# Patient Record
Sex: FEMALE | Race: WHITE | Employment: FULL TIME | URBAN - METROPOLITAN AREA
[De-identification: names, ages, dates, MRNs, and addresses within clinical notes are randomized per-mention and may not be internally consistent; named-entity substitution may affect disease eponyms.]

---

## 2017-02-03 RX ORDER — CHLORAL HYDRATE 500 MG
1000 CAPSULE ORAL
COMMUNITY
End: 2019-06-29 | Stop reason: ALTCHOICE

## 2017-02-03 RX ORDER — GARLIC EXTRACT 500 MG
1 CAPSULE ORAL DAILY
COMMUNITY
End: 2018-08-06

## 2017-02-03 RX ORDER — MULTIVIT WITH MINERALS/LUTEIN
250 TABLET ORAL DAILY
COMMUNITY
End: 2018-06-04

## 2017-02-03 RX ORDER — DOCUSATE SODIUM 100 MG/1
100 CAPSULE, LIQUID FILLED ORAL DAILY
COMMUNITY
End: 2019-01-16

## 2017-02-10 ENCOUNTER — APPOINTMENT (OUTPATIENT)
Dept: LAB | Age: 57
End: 2017-02-10
Payer: COMMERCIAL

## 2017-02-10 DIAGNOSIS — M18.12 OSTEOARTHRITIS OF CARPOMETACARPAL JOINT OF LEFT THUMB: ICD-10-CM

## 2017-02-10 LAB
ATRIAL RATE: 67 BPM
BUN BLD-MCNC: 15 MG/DL (ref 8–20)
CALCIUM BLD-MCNC: 9 MG/DL (ref 8.3–10.3)
CHLORIDE: 105 MMOL/L (ref 101–111)
CO2: 26 MMOL/L (ref 22–32)
CREAT BLD-MCNC: 0.61 MG/DL (ref 0.55–1.02)
GLUCOSE BLD-MCNC: 92 MG/DL (ref 70–99)
P AXIS: 53 DEGREES
P-R INTERVAL: 194 MS
POTASSIUM SERPL-SCNC: 4.1 MMOL/L (ref 3.6–5.1)
Q-T INTERVAL: 394 MS
QRS DURATION: 80 MS
QTC CALCULATION (BEZET): 416 MS
R AXIS: 6 DEGREES
SODIUM SERPL-SCNC: 140 MMOL/L (ref 136–144)
T AXIS: 25 DEGREES
VENTRICULAR RATE: 67 BPM

## 2017-02-10 PROCEDURE — 80048 BASIC METABOLIC PNL TOTAL CA: CPT

## 2017-02-10 PROCEDURE — 93005 ELECTROCARDIOGRAM TRACING: CPT

## 2017-02-10 PROCEDURE — 93010 ELECTROCARDIOGRAM REPORT: CPT | Performed by: INTERNAL MEDICINE

## 2017-02-10 PROCEDURE — 36415 COLL VENOUS BLD VENIPUNCTURE: CPT

## 2017-02-28 ENCOUNTER — HOSPITAL ENCOUNTER (OUTPATIENT)
Facility: HOSPITAL | Age: 57
Setting detail: HOSPITAL OUTPATIENT SURGERY
Discharge: HOME | End: 2017-02-28
Attending: ORTHOPAEDIC SURGERY | Admitting: ORTHOPAEDIC SURGERY
Payer: COMMERCIAL

## 2017-02-28 ENCOUNTER — ANESTHESIA (OUTPATIENT)
Dept: SURGERY | Facility: HOSPITAL | Age: 57
End: 2017-02-28
Payer: COMMERCIAL

## 2017-02-28 ENCOUNTER — SURGERY (OUTPATIENT)
Age: 57
End: 2017-02-28

## 2017-02-28 ENCOUNTER — ANESTHESIA EVENT (OUTPATIENT)
Dept: SURGERY | Facility: HOSPITAL | Age: 57
End: 2017-02-28
Payer: COMMERCIAL

## 2017-02-28 VITALS
DIASTOLIC BLOOD PRESSURE: 64 MMHG | RESPIRATION RATE: 18 BRPM | BODY MASS INDEX: 39.26 KG/M2 | WEIGHT: 250.13 LBS | SYSTOLIC BLOOD PRESSURE: 127 MMHG | HEIGHT: 67 IN | TEMPERATURE: 98 F | OXYGEN SATURATION: 94 % | HEART RATE: 79 BPM

## 2017-02-28 DIAGNOSIS — M18.12 OSTEOARTHRITIS OF CARPOMETACARPAL JOINT OF LEFT THUMB: Primary | ICD-10-CM

## 2017-02-28 PROCEDURE — 0RQT0ZZ REPAIR LEFT CARPOMETACARPAL JOINT, OPEN APPROACH: ICD-10-PCS | Performed by: ORTHOPAEDIC SURGERY

## 2017-02-28 PROCEDURE — 3E0T3CZ INTRODUCTION OF REGIONAL ANESTHETIC INTO PERIPHERAL NERVES AND PLEXI, PERCUTANEOUS APPROACH: ICD-10-PCS | Performed by: ANESTHESIOLOGY

## 2017-02-28 PROCEDURE — 76942 ECHO GUIDE FOR BIOPSY: CPT | Performed by: ORTHOPAEDIC SURGERY

## 2017-02-28 RX ORDER — MEPERIDINE HYDROCHLORIDE 25 MG/ML
12.5 INJECTION INTRAMUSCULAR; INTRAVENOUS; SUBCUTANEOUS AS NEEDED
Status: DISCONTINUED | OUTPATIENT
Start: 2017-02-28 | End: 2017-02-28

## 2017-02-28 RX ORDER — HYDROMORPHONE HYDROCHLORIDE 1 MG/ML
0.4 INJECTION, SOLUTION INTRAMUSCULAR; INTRAVENOUS; SUBCUTANEOUS EVERY 5 MIN PRN
Status: DISCONTINUED | OUTPATIENT
Start: 2017-02-28 | End: 2017-02-28

## 2017-02-28 RX ORDER — SCOLOPAMINE TRANSDERMAL SYSTEM 1 MG/1
1 PATCH, EXTENDED RELEASE TRANSDERMAL
Status: DISCONTINUED | OUTPATIENT
Start: 2017-02-28 | End: 2017-02-28 | Stop reason: HOSPADM

## 2017-02-28 RX ORDER — ONDANSETRON 2 MG/ML
4 INJECTION INTRAMUSCULAR; INTRAVENOUS AS NEEDED
Status: DISCONTINUED | OUTPATIENT
Start: 2017-02-28 | End: 2017-02-28

## 2017-02-28 RX ORDER — DEXAMETHASONE SODIUM PHOSPHATE 4 MG/ML
4 VIAL (ML) INJECTION AS NEEDED
Status: DISCONTINUED | OUTPATIENT
Start: 2017-02-28 | End: 2017-02-28

## 2017-02-28 RX ORDER — NALOXONE HYDROCHLORIDE 0.4 MG/ML
80 INJECTION, SOLUTION INTRAMUSCULAR; INTRAVENOUS; SUBCUTANEOUS AS NEEDED
Status: DISCONTINUED | OUTPATIENT
Start: 2017-02-28 | End: 2017-02-28

## 2017-02-28 RX ORDER — SCOLOPAMINE TRANSDERMAL SYSTEM 1 MG/1
PATCH, EXTENDED RELEASE TRANSDERMAL
Status: DISCONTINUED
Start: 2017-02-28 | End: 2017-02-28

## 2017-02-28 RX ORDER — SODIUM CHLORIDE, SODIUM LACTATE, POTASSIUM CHLORIDE, CALCIUM CHLORIDE 600; 310; 30; 20 MG/100ML; MG/100ML; MG/100ML; MG/100ML
INJECTION, SOLUTION INTRAVENOUS CONTINUOUS
Status: DISCONTINUED | OUTPATIENT
Start: 2017-02-28 | End: 2017-02-28

## 2017-02-28 RX ORDER — METOCLOPRAMIDE HYDROCHLORIDE 5 MG/ML
10 INJECTION INTRAMUSCULAR; INTRAVENOUS AS NEEDED
Status: DISCONTINUED | OUTPATIENT
Start: 2017-02-28 | End: 2017-02-28

## 2017-02-28 RX ORDER — MIDAZOLAM HYDROCHLORIDE 1 MG/ML
1 INJECTION INTRAMUSCULAR; INTRAVENOUS EVERY 5 MIN PRN
Status: DISCONTINUED | OUTPATIENT
Start: 2017-02-28 | End: 2017-02-28

## 2017-02-28 NOTE — ANESTHESIA PREPROCEDURE EVALUATION
PRE-OP EVALUATION    Patient Name: Sophie Vela    Pre-op Diagnosis: LEFT THUMB CARPOMETACARPAL OSTEOARTHRITIS    Procedure(s):  LEFT THUMB CARPOMETACARPAL JOINT SUSPENSION ARTHROPLASTY    Surgeon(s) and Role:     Elsa Louise MD - Primary    Pre- >4    (+) obesity  (+) hypertension and well controlled  (+) hyperlipidemia                                  Endo/Other    Negative endo/other ROS.                       (+) arthritis       Pulmonary                 (-) recent URI   (-) sleep apnea (states BRANCH NERVE BLOCK;  Surgeon: Apolonia Hobbs MD;  Location: [address] [place] [address]  [id number]    Comment microdisectomy-lumbar-fusion    BACK SURGERY  5/17/16    Comment Hardward removal L1-5; fusion L1-4        Smoking status: F

## 2017-02-28 NOTE — ANESTHESIA POSTPROCEDURE EVALUATION
[address] [facility] Patient Status:  Hospital Outpatient Surgery   Age/Gender 61year old female MRN [id number]   [facility] SURGERY Attending Wilbert Kim MD   [address] Day # 0 PCP Yumiko Ibarra MD       Anesthesia Post-

## 2017-02-28 NOTE — H&P
[address] Patient Status:  Hospital Outpatient Surgery    1960 MRN [id number]   [facility] SURGERY Attending Janak Cheng MD   [facility] Day #  PCP Vanita Hunter MD     History of Pres BRANCH NERVE BLOCK;  Surgeon: Apolonia Hobbs MD;  Location: [facility]IN [facility]    INJ PARAVERT F JNT L/S 1 LEV Bilateral 10/19/2015    Comment Procedure: LUMBAR FACET INJECTION OR MEDIAL BRANCH NERVE BLOCK;  Surgeon: Apolonia Hobbs MD;  L are equal and round, reactive to light and accommodate. Pupils are approximately 3mm and react to 2mm with reaction to light. Oropharynx is clear. Neck: No tenderness to palpitation.   Full range of motion to flexion and extension, lateral rotation and l

## 2017-02-28 NOTE — BRIEF OP NOTE
[facility] SURGERY  Brief Op Note     Leif Kenzie Location: OR   CSN [id number] MRN [id number]   Admission Date 2/28/2017 Operation Date 2/28/2017   Attending Physician Slim Matias MD Operating Physician Cornelio Rendon MD       Pre-Operative Diag

## 2017-03-01 NOTE — OPERATIVE REPORT
[facility]    PATIENT'S NAME: Corrinne Corti   ATTENDING PHYSICIAN: Florian Carmichael M.D. OPERATING PHYSICIAN: Florian Carmichael M.D.    PATIENT ACCOUNT#:   [de-identified]    LOCATION:  [address] [place] [address] 10  MEDICAL RECORD #:   [id number]       DATE A tourniquet was placed about the left biceps, and the left upper extremity was prepped and draped in the standard surgical fashion. A surgical time-out was taken, in which the proper patient, surgical site, and procedure were verified.   The limb was exsa Ethibond suture. The graft was then looped around the remnant of the flexor carpi radialis tendon, and this was then woven around each tendon 3 separate times for the complete tendon weave.   This was tensioned, and then was sutured in place with the 3-0 E

## 2017-03-10 PROBLEM — M18.12 OSTEOARTHRITIS OF CARPOMETACARPAL JOINT OF LEFT THUMB: Status: ACTIVE | Noted: 2017-03-10

## 2017-04-03 ENCOUNTER — OFFICE VISIT (OUTPATIENT)
Dept: OCCUPATIONAL MEDICINE | Age: 57
End: 2017-04-03
Attending: ORTHOPAEDIC SURGERY
Payer: COMMERCIAL

## 2017-04-03 PROCEDURE — 97166 OT EVAL MOD COMPLEX 45 MIN: CPT

## 2017-04-03 PROCEDURE — 97110 THERAPEUTIC EXERCISES: CPT

## 2017-04-03 NOTE — PROGRESS NOTES
OCCUPATIONAL THERAPY UPPER EXTREMITY EVALUATION   Referring Physician: Dr. Hermelindo Gibbons  Diagnosis:    L [facility]  Date of Service: 4/3/2017     PATIENT SUMMARY   Garrett Stanley is a 61year old y/o female who presents to therapy toda Comfort Cool splint most of the day. SCAR: Flexible, mildly adhered. Well healed. SENSORY: WNL, however pt notes hypersensitivity to heat on distal incision.      CIRCUMFERENTIAL EDEMA (cm):  Right Wrist crease: 16.1  Left Wrist crease: 16  Right M and for this course of care. Thank you for your referral. Please co-sign or sign and return this letter via fax as soon as possible to [phone].  If you have any questions, please contact me at Dept: [phone]    Sincerely,  Electronically signed

## 2017-04-06 ENCOUNTER — OFFICE VISIT (OUTPATIENT)
Dept: OCCUPATIONAL MEDICINE | Age: 57
End: 2017-04-06
Attending: ORTHOPAEDIC SURGERY
Payer: COMMERCIAL

## 2017-04-06 PROCEDURE — 97140 MANUAL THERAPY 1/> REGIONS: CPT

## 2017-04-06 PROCEDURE — 97110 THERAPEUTIC EXERCISES: CPT

## 2017-04-06 PROCEDURE — 97035 APP MDLTY 1+ULTRASOUND EA 15: CPT

## 2017-04-10 ENCOUNTER — OFFICE VISIT (OUTPATIENT)
Dept: OCCUPATIONAL MEDICINE | Age: 57
End: 2017-04-10
Attending: ORTHOPAEDIC SURGERY
Payer: COMMERCIAL

## 2017-04-10 PROCEDURE — 97035 APP MDLTY 1+ULTRASOUND EA 15: CPT

## 2017-04-10 PROCEDURE — 97140 MANUAL THERAPY 1/> REGIONS: CPT

## 2017-04-10 PROCEDURE — 97110 THERAPEUTIC EXERCISES: CPT

## 2017-04-10 NOTE — PROGRESS NOTES
Dx: [facility] suspension arthroplasty         Authorized # of Visits:  PPO         Next MD visit: none scheduled  Fall Risk: standard         Precautions: n/a             Subjective: \"Pain hasn't been too bad.   I'm trying to limit the use of the splint a little 45 min     Total Treatment Time: 45 min

## 2017-04-13 ENCOUNTER — APPOINTMENT (OUTPATIENT)
Dept: OCCUPATIONAL MEDICINE | Age: 57
End: 2017-04-13
Attending: ORTHOPAEDIC SURGERY
Payer: COMMERCIAL

## 2017-04-17 ENCOUNTER — OFFICE VISIT (OUTPATIENT)
Dept: OCCUPATIONAL MEDICINE | Age: 57
End: 2017-04-17
Attending: ORTHOPAEDIC SURGERY
Payer: COMMERCIAL

## 2017-04-17 PROCEDURE — 97110 THERAPEUTIC EXERCISES: CPT

## 2017-04-17 PROCEDURE — 97140 MANUAL THERAPY 1/> REGIONS: CPT

## 2017-04-17 PROCEDURE — 97035 APP MDLTY 1+ULTRASOUND EA 15: CPT

## 2017-04-17 NOTE — PROGRESS NOTES
Dx: [facility] suspension arthroplasty         Authorized # of Visits:  PPO         Next MD visit: 4/26/17  Fall Risk: standard         Precautions: n/a             Subjective: \"Things are good.   I've been going without the brace at home and for a little bit at w flexion/extension  RD/UD  Pronation/supination x 20        Pen click for IP isolation                                   Skilled Services: Upgrade HEP      Charges: Ofelia 1( 15 min) 1 MT ( 15 min), 1 US   Total Timed Treatment: 45 min     Total Treatment Time

## 2017-04-20 ENCOUNTER — OFFICE VISIT (OUTPATIENT)
Dept: OCCUPATIONAL MEDICINE | Age: 57
End: 2017-04-20
Attending: ORTHOPAEDIC SURGERY
Payer: COMMERCIAL

## 2017-04-20 PROCEDURE — 97140 MANUAL THERAPY 1/> REGIONS: CPT

## 2017-04-20 PROCEDURE — 97110 THERAPEUTIC EXERCISES: CPT

## 2017-04-20 PROCEDURE — 97035 APP MDLTY 1+ULTRASOUND EA 15: CPT

## 2017-04-24 ENCOUNTER — OFFICE VISIT (OUTPATIENT)
Dept: OCCUPATIONAL MEDICINE | Age: 57
End: 2017-04-24
Attending: ORTHOPAEDIC SURGERY
Payer: COMMERCIAL

## 2017-04-24 PROCEDURE — 97035 APP MDLTY 1+ULTRASOUND EA 15: CPT

## 2017-04-24 PROCEDURE — 97140 MANUAL THERAPY 1/> REGIONS: CPT

## 2017-04-24 PROCEDURE — 97110 THERAPEUTIC EXERCISES: CPT

## 2017-04-24 NOTE — PROGRESS NOTES
Dx: [facility] suspension arthroplasty         Authorized # of Visits:  PPO         Next MD visit: 4/26/17  Fall Risk: standard         Precautions: n/a              Progress Summary    Pt has attended 6, cancelled 0, and no shown 0 visits in Occupational Therapy Date: 4/6/2017  Tx#: 2/10 Date: 4/10/17  Tx#: 3/10 Date: 4/17/17  Tx#: 4/10 Date: 4/20/2017  Tx#: 5/10 Date: 4/24/17  Tx#: 6/10 Date: Tx#: 7/ Date:    Tx#: 8/   U/S 1.2 w/cm2, 3 mhz, 50%, 8 min U/S 1.2 w/cm2, 3 mhz, 50%, 8 min U/S 1.2 w/cm2, 3 mhz,

## 2017-05-09 ENCOUNTER — OFFICE VISIT (OUTPATIENT)
Dept: OCCUPATIONAL MEDICINE | Age: 57
End: 2017-05-09
Attending: ORTHOPAEDIC SURGERY
Payer: COMMERCIAL

## 2017-05-09 PROCEDURE — 97140 MANUAL THERAPY 1/> REGIONS: CPT

## 2017-05-09 PROCEDURE — 97035 APP MDLTY 1+ULTRASOUND EA 15: CPT

## 2017-05-09 PROCEDURE — 97110 THERAPEUTIC EXERCISES: CPT

## 2017-05-09 NOTE — PROGRESS NOTES
Dx: Aia 16 suspension arthroplasty         Authorized # of Visits:  PPO         Next MD visit: 4/26/17  Fall Risk: standard         Precautions: n/a              Progress Summary    Pt has attended 7,  cancelled 0, and no shown 1 visits in [place] [address] 1.2 w/cm2, 3 mhz, 50%, 8 min U/S 1.2 w/cm2, 3 mhz, 50%, 8 min U/S 1.2 w/cm2, 3 mhz, 50%, 8 min U/S 1.2 w/cm2, 3 mhz, 50%, 8 min    Scar mobilization Scar mobilization Scar mobilization Scar mobilization Scar mobilization Scar mobilization    A/AA/gentle AL

## 2017-05-15 ENCOUNTER — OFFICE VISIT (OUTPATIENT)
Dept: OCCUPATIONAL MEDICINE | Age: 57
End: 2017-05-15
Attending: ORTHOPAEDIC SURGERY
Payer: COMMERCIAL

## 2017-05-15 PROCEDURE — 97110 THERAPEUTIC EXERCISES: CPT

## 2017-05-15 PROCEDURE — 97035 APP MDLTY 1+ULTRASOUND EA 15: CPT

## 2017-05-15 PROCEDURE — 97140 MANUAL THERAPY 1/> REGIONS: CPT

## 2017-05-15 NOTE — PROGRESS NOTES
Dx: [facility] suspension arthroplasty         Authorized # of Visits:  PPO         Next MD visit: 4/26/17  Fall Risk: standard         Precautions: n/a              Progress Summary    Pt has attended 8,  cancelled 0, and no shown 1 visits in [place] [address] 3 mhz, 50%, 8 min U/S 1.2 w/cm2, 3 mhz, 50%, 8 min U/S 1.2 w/cm2, 3 mhz, 50%, 8 min U/S 1.2 w/cm2, 3 mhz, 50%, 8 min U/S 1.2 w/cm2, 3 mhz, 50%, 8 min   Scar mobilization Scar mobilization Scar mobilization Scar mobilization Scar mobilization Scar mobilizat

## 2017-05-22 ENCOUNTER — APPOINTMENT (OUTPATIENT)
Dept: OCCUPATIONAL MEDICINE | Age: 57
End: 2017-05-22
Attending: ORTHOPAEDIC SURGERY
Payer: COMMERCIAL

## 2017-06-26 ENCOUNTER — LABORATORY ENCOUNTER (OUTPATIENT)
Dept: LAB | Age: 57
End: 2017-06-26
Attending: SURGERY
Payer: COMMERCIAL

## 2017-06-26 DIAGNOSIS — E04.1 THYROID NODULE: ICD-10-CM

## 2017-06-26 PROCEDURE — 88173 CYTOPATH EVAL FNA REPORT: CPT

## 2017-06-30 NOTE — PROGRESS NOTES
eliel, please notify patient with benign biopsy of the thyroid  Repeat US in 12 months for stability

## 2017-11-13 PROCEDURE — 86200 CCP ANTIBODY: CPT | Performed by: INTERNAL MEDICINE

## 2017-12-25 NOTE — PROGRESS NOTES
Dx: [facility] suspension arthroplasty         Authorized # of Visits:  PPO         Next MD visit: none scheduled  Fall Risk: standard         Precautions: n/a             Subjective: \"Pain is about the same.  When I use the TENS unit, it throbs for hours afterwa
no cough

## 2018-06-11 PROCEDURE — 87086 URINE CULTURE/COLONY COUNT: CPT | Performed by: FAMILY MEDICINE

## 2019-02-01 PROCEDURE — 83921 ORGANIC ACID SINGLE QUANT: CPT | Performed by: OTHER

## 2019-02-01 PROCEDURE — 83883 ASSAY NEPHELOMETRY NOT SPEC: CPT | Performed by: OTHER

## 2019-02-01 PROCEDURE — 84165 PROTEIN E-PHORESIS SERUM: CPT | Performed by: OTHER

## 2019-02-01 PROCEDURE — 86618 LYME DISEASE ANTIBODY: CPT | Performed by: OTHER

## 2019-02-01 PROCEDURE — 86334 IMMUNOFIX E-PHORESIS SERUM: CPT | Performed by: OTHER

## 2019-02-01 PROCEDURE — 81003 URINALYSIS AUTO W/O SCOPE: CPT | Performed by: FAMILY MEDICINE

## 2019-02-01 PROCEDURE — 84425 ASSAY OF VITAMIN B-1: CPT | Performed by: OTHER

## 2019-02-13 ENCOUNTER — HOSPITAL ENCOUNTER (OUTPATIENT)
Facility: HOSPITAL | Age: 59
Setting detail: HOSPITAL OUTPATIENT SURGERY
Discharge: HOME | End: 2019-02-13
Attending: INTERNAL MEDICINE | Admitting: INTERNAL MEDICINE
Payer: COMMERCIAL

## 2019-02-13 VITALS
TEMPERATURE: 99 F | HEART RATE: 88 BPM | BODY MASS INDEX: 40.81 KG/M2 | HEIGHT: 67 IN | OXYGEN SATURATION: 95 % | WEIGHT: 260 LBS | RESPIRATION RATE: 18 BRPM | DIASTOLIC BLOOD PRESSURE: 65 MMHG | SYSTOLIC BLOOD PRESSURE: 110 MMHG

## 2019-02-13 DIAGNOSIS — R13.10 DYSPHAGIA, UNSPECIFIED TYPE: ICD-10-CM

## 2019-02-13 DIAGNOSIS — Z80.0 FAMILY HISTORY OF COLON CANCER: ICD-10-CM

## 2019-02-13 PROCEDURE — 88305 TISSUE EXAM BY PATHOLOGIST: CPT | Performed by: INTERNAL MEDICINE

## 2019-02-13 PROCEDURE — 99153 MOD SED SAME PHYS/QHP EA: CPT | Performed by: INTERNAL MEDICINE

## 2019-02-13 PROCEDURE — 0DJD8ZZ INSPECTION OF LOWER INTESTINAL TRACT, VIA NATURAL OR ARTIFICIAL OPENING ENDOSCOPIC: ICD-10-PCS | Performed by: INTERNAL MEDICINE

## 2019-02-13 PROCEDURE — 0D748ZZ DILATION OF ESOPHAGOGASTRIC JUNCTION, VIA NATURAL OR ARTIFICIAL OPENING ENDOSCOPIC: ICD-10-PCS | Performed by: INTERNAL MEDICINE

## 2019-02-13 PROCEDURE — 0DB58ZX EXCISION OF ESOPHAGUS, VIA NATURAL OR ARTIFICIAL OPENING ENDOSCOPIC, DIAGNOSTIC: ICD-10-PCS | Performed by: INTERNAL MEDICINE

## 2019-02-13 PROCEDURE — 99152 MOD SED SAME PHYS/QHP 5/>YRS: CPT | Performed by: INTERNAL MEDICINE

## 2019-02-13 RX ORDER — MIDAZOLAM HYDROCHLORIDE 1 MG/ML
INJECTION INTRAMUSCULAR; INTRAVENOUS
Status: DISCONTINUED | OUTPATIENT
Start: 2019-02-13 | End: 2019-02-13

## 2019-02-13 RX ORDER — SODIUM CHLORIDE, SODIUM LACTATE, POTASSIUM CHLORIDE, CALCIUM CHLORIDE 600; 310; 30; 20 MG/100ML; MG/100ML; MG/100ML; MG/100ML
INJECTION, SOLUTION INTRAVENOUS CONTINUOUS
Status: DISCONTINUED | OUTPATIENT
Start: 2019-02-13 | End: 2019-02-13

## 2019-02-13 NOTE — H&P
[facility] Endoscopy Health History   [facility] Department of  Gastroenterology  Update Health History :       Ford Handing  female   Suze Pascual MD     [id number]  5/14/1960 Primary Care Physician  Colten Hernandez MD RIGHT      1986   • TONSILLECTOMY Bilateral 9/26/2014    Performed by Berny Cano MD at [facility] MAIN OR      Family History   Problem Relation Age of Onset   • Hypertension Mother    • Other (hypothyroid) Mother    • Other (congestive heart failure) Moth Location: Left arm)   Pulse 71   Temp 98.7 °F (37.1 °C) (Oral)   Resp 20   Ht 5' 7\" (1.702 m)   Wt 260 lb (117.9 kg)   LMP 04/07/1997 (Approximate)   SpO2 97%   BMI 40.72 kg/m²   GENERAL: well developed, well nourished, in no apparent distress   SKIN: no

## 2019-02-18 NOTE — PROGRESS NOTES
2/18/2019  Jan Braga  [facility] [id number]    Dear Deanna Whitehead,       Here are the biopsy/pathology findings from your recent EGD (Upper  Endoscopy):    gastritis - an inflammation of the lining of the stomach      If you need any fu

## 2019-02-22 PROCEDURE — 86317 IMMUNOASSAY INFECTIOUS AGENT: CPT | Performed by: ALLERGY & IMMUNOLOGY

## 2019-02-22 PROCEDURE — 86774 TETANUS ANTIBODY: CPT | Performed by: ALLERGY & IMMUNOLOGY

## 2019-02-22 PROCEDURE — 82784 ASSAY IGA/IGD/IGG/IGM EACH: CPT | Performed by: ALLERGY & IMMUNOLOGY

## 2019-02-22 PROCEDURE — 86648 DIPHTHERIA ANTIBODY: CPT | Performed by: ALLERGY & IMMUNOLOGY

## 2019-02-22 PROCEDURE — 36415 COLL VENOUS BLD VENIPUNCTURE: CPT | Performed by: ALLERGY & IMMUNOLOGY

## 2019-02-26 PROCEDURE — 87086 URINE CULTURE/COLONY COUNT: CPT | Performed by: PHYSICIAN ASSISTANT

## 2019-02-26 PROCEDURE — 87186 SC STD MICRODIL/AGAR DIL: CPT | Performed by: PHYSICIAN ASSISTANT

## 2019-02-26 PROCEDURE — 87088 URINE BACTERIA CULTURE: CPT | Performed by: PHYSICIAN ASSISTANT

## 2019-03-09 PROCEDURE — 87086 URINE CULTURE/COLONY COUNT: CPT | Performed by: PHYSICIAN ASSISTANT

## 2019-03-20 PROCEDURE — 87086 URINE CULTURE/COLONY COUNT: CPT | Performed by: FAMILY MEDICINE

## 2019-06-17 PROBLEM — E03.9 ACQUIRED HYPOTHYROIDISM: Status: ACTIVE | Noted: 2019-06-17

## 2019-06-28 PROCEDURE — 82308 ASSAY OF CALCITONIN: CPT | Performed by: INTERNAL MEDICINE

## 2019-06-29 PROCEDURE — 87086 URINE CULTURE/COLONY COUNT: CPT | Performed by: FAMILY MEDICINE

## 2019-06-29 PROCEDURE — 87186 SC STD MICRODIL/AGAR DIL: CPT | Performed by: FAMILY MEDICINE

## 2019-06-29 PROCEDURE — 87077 CULTURE AEROBIC IDENTIFY: CPT | Performed by: FAMILY MEDICINE

## 2019-07-23 PROCEDURE — 83497 ASSAY OF 5-HIAA: CPT | Performed by: INTERNAL MEDICINE

## 2019-08-02 NOTE — OPERATIVE REPORT
I know you were working on this was it approved?   [facility]    PATIENT'S NAME: Michelle Debbie   ATTENDING PHYSICIAN: Juan Ramirez M.D. OPERATING PHYSICIAN: Juan Ramirez M.D.    PATIENT ACCOUNT#:   [de-identified]    LOCATION:  [facility] ROOMS 6 EDWP 10  MEDICAL RECORD #:   [id number]

## 2019-10-24 PROCEDURE — 88305 TISSUE EXAM BY PATHOLOGIST: CPT | Performed by: INTERNAL MEDICINE

## 2020-01-07 PROBLEM — N30.01 ACUTE CYSTITIS WITH HEMATURIA: Status: ACTIVE | Noted: 2020-01-07

## 2020-04-20 PROBLEM — M67.449 DIGITAL MUCINOUS CYST: Status: ACTIVE | Noted: 2020-04-20

## 2020-05-07 ENCOUNTER — OFFICE VISIT (OUTPATIENT)
Dept: PHYSICAL THERAPY | Age: 60
End: 2020-05-07
Attending: ORTHOPAEDIC SURGERY
Payer: COMMERCIAL

## 2020-05-07 DIAGNOSIS — M47.814 THORACIC SPONDYLOSIS WITHOUT MYELOPATHY: ICD-10-CM

## 2020-05-07 PROCEDURE — 97162 PT EVAL MOD COMPLEX 30 MIN: CPT

## 2020-05-07 PROCEDURE — 97112 NEUROMUSCULAR REEDUCATION: CPT

## 2020-05-07 PROCEDURE — 97140 MANUAL THERAPY 1/> REGIONS: CPT

## 2020-05-07 NOTE — PROGRESS NOTES
SPINE EVALUATION:   Referring Physician: Dr. Jessica Melgoza  Diagnosis: R lumbar pain and Thoracic spondylosis  Date of Service: 5/7/2020     PATIENT SUMMARY   Yobany Gregory is a 60year old female who presents to therapy today with complaints of pain in her spine prior to possible surgery. Past medical history was reviewed with Cydney Smith.  Significant findings include a past medical history of Anesthesia complication, Arthritis, High blood pressure, High cholesterol, hyperlipidemia, Thyroid nodule, Unspecified s in R low back);  L 75%    Accessory motion: not tested due to inability to tolerate prone positioning  Palpation: L nontender thoracolumbar paraspinals; MOD tender L glut med, piriformis; MAX tender R lumbar paraspinals, QL, Lumbosacral fascia, glut med, TF will improve transversus abdominis recruitment to perform proper isometric contraction without requiring verbal or tactile cuing to promote advancement of therex   · Pt will demonstrate good understanding of proper posture and body mechanics to decrease pa

## 2020-05-07 NOTE — PATIENT INSTRUCTIONS
Access Code: [email or web address]   URL: [email or web address]   Date: 05/07/2020   Prepared by: Angie Calhoun     Exercises  Seated Transversus Abdominis Bracing - 10 reps - 2 sets - 2x daily - 5x weekly  Seated March - 10 reps - 2 sets - 2x daily - 5x we

## 2020-05-12 ENCOUNTER — OFFICE VISIT (OUTPATIENT)
Dept: PHYSICAL THERAPY | Age: 60
End: 2020-05-12
Attending: ORTHOPAEDIC SURGERY
Payer: COMMERCIAL

## 2020-05-12 PROCEDURE — 97110 THERAPEUTIC EXERCISES: CPT

## 2020-05-12 PROCEDURE — 97140 MANUAL THERAPY 1/> REGIONS: CPT

## 2020-05-12 PROCEDURE — 97112 NEUROMUSCULAR REEDUCATION: CPT

## 2020-05-12 NOTE — PROGRESS NOTES
Dx: R lumbar pain and Thoracic spondylosis          Insurance (Authorized # of Visits):  Isela Mcpherson (30 visits; 10 prior to start of PT)           Authorizing Physician: Dr. Juve Salazar  Next MD visit: none scheduled  Fall Risk: standard         Precautions: Thoracic extension) x5 on table       Neuro Re-ed  Seated on SB TA 5s hold x15  -with march (2 UE) x20       Manual  Seated STM R piriformis, glut, lumbar paraspinals and QL x8 min              HEP: L sidelying over pillow for R lumbar gapping; wall trunk shift cor

## 2020-05-14 ENCOUNTER — OFFICE VISIT (OUTPATIENT)
Dept: PHYSICAL THERAPY | Age: 60
End: 2020-05-14
Attending: ORTHOPAEDIC SURGERY
Payer: COMMERCIAL

## 2020-05-14 PROCEDURE — 97140 MANUAL THERAPY 1/> REGIONS: CPT

## 2020-05-14 PROCEDURE — 97112 NEUROMUSCULAR REEDUCATION: CPT

## 2020-05-14 PROCEDURE — 97110 THERAPEUTIC EXERCISES: CPT

## 2020-05-14 NOTE — PROGRESS NOTES
Dx: R lumbar pain and Thoracic spondylosis          Insurance (Authorized # of Visits):  Ana Duffy (30 visits; 10 prior to start of PT)           Authorizing Physician: Dr. Oumar Hermosillo MD visit: none scheduled  Fall Risk: standard         Precautions: Thoracic ceiling stretch (no extension) x5 on table Therex  NuStep L4 x5 min  Seated QL stretch central, R, L at table 3x30s ea  Wall shift correct 3x30s ea side  Seated on SB M/L wt shifts 2 UE x10  Modified floor to ceiling stretch (no extension) x5 on table  SB

## 2020-05-19 ENCOUNTER — OFFICE VISIT (OUTPATIENT)
Dept: PHYSICAL THERAPY | Age: 60
End: 2020-05-19
Attending: ORTHOPAEDIC SURGERY
Payer: COMMERCIAL

## 2020-05-19 PROCEDURE — 97110 THERAPEUTIC EXERCISES: CPT

## 2020-05-19 PROCEDURE — 97140 MANUAL THERAPY 1/> REGIONS: CPT

## 2020-05-19 PROCEDURE — 97112 NEUROMUSCULAR REEDUCATION: CPT

## 2020-05-19 NOTE — PROGRESS NOTES
Dx: R lumbar pain and Thoracic spondylosis          Insurance (Authorized # of Visits):  Baron Diaz (30 visits; 10 prior to start of PT)           Authorizing Physician: Dr. Mary Guajardo  Next MD visit: none scheduled  Fall Risk: standard         Precautions: Thoracic 3x30s ea side  Seated on SB M/L wt shifts 2 UE x10  Modified floor to ceiling stretch (no extension) x5 on table Therex  NuStep L4 x5 min  Seated QL stretch central, R, L at table 3x30s ea  Wall shift correct 3x30s ea side  Seated on SB M/L wt shifts 2 UE

## 2020-05-21 ENCOUNTER — OFFICE VISIT (OUTPATIENT)
Dept: PHYSICAL THERAPY | Age: 60
End: 2020-05-21
Attending: ORTHOPAEDIC SURGERY
Payer: COMMERCIAL

## 2020-05-21 PROCEDURE — 97112 NEUROMUSCULAR REEDUCATION: CPT

## 2020-05-21 PROCEDURE — 97110 THERAPEUTIC EXERCISES: CPT

## 2020-05-21 PROCEDURE — 97140 MANUAL THERAPY 1/> REGIONS: CPT

## 2020-05-21 NOTE — PROGRESS NOTES
Dx: R lumbar pain and Thoracic spondylosis          Insurance (Authorized # of Visits):  Zuleyka Myers (30 visits; 10 prior to start of PT)           Authorizing Physician: Dr. Kevin Olivo  Next MD visit: none scheduled  Fall Risk: standard         Precautions: Thoracic progress HEP  Date: 5/12/2020  TX#: 2/12 Date: 5/14/2020            TX#: 3/12 Date: 5/19/2020             TX#: 4/12 Date: 5/21/2020             TX#: 5/12 Date:    Tx#: 6/   Therex  NuStep L4 x5 min  Seated QL stretch central, R, L at table 3x30s ea  Wall sh ea Manual  Seated myobuddy STM R piriformis, glut, TFL/ITB, YONATHAN lumbar paraspinals and QL x8 min  Seated PA T8-L4 Gr II 2x15s ea    HEP: L sidelying over pillow for R lumbar gapping; wall trunk shift correction, seated TA engagement, seated TA with march

## 2020-05-26 ENCOUNTER — APPOINTMENT (OUTPATIENT)
Dept: PHYSICAL THERAPY | Age: 60
End: 2020-05-26
Attending: ORTHOPAEDIC SURGERY
Payer: COMMERCIAL

## 2020-05-26 ENCOUNTER — TELEPHONE (OUTPATIENT)
Dept: PHYSICAL THERAPY | Age: 60
End: 2020-05-26

## 2020-05-28 PROCEDURE — 88304 TISSUE EXAM BY PATHOLOGIST: CPT | Performed by: ORTHOPAEDIC SURGERY

## 2020-06-02 ENCOUNTER — APPOINTMENT (OUTPATIENT)
Dept: PHYSICAL THERAPY | Age: 60
End: 2020-06-02
Attending: ORTHOPAEDIC SURGERY
Payer: COMMERCIAL

## 2020-06-04 ENCOUNTER — OFFICE VISIT (OUTPATIENT)
Dept: PHYSICAL THERAPY | Age: 60
End: 2020-06-04
Attending: ORTHOPAEDIC SURGERY
Payer: COMMERCIAL

## 2020-06-04 PROCEDURE — 97110 THERAPEUTIC EXERCISES: CPT

## 2020-06-04 PROCEDURE — 97112 NEUROMUSCULAR REEDUCATION: CPT

## 2020-06-04 PROCEDURE — 97140 MANUAL THERAPY 1/> REGIONS: CPT

## 2020-06-04 NOTE — PROGRESS NOTES
Dx: R lumbar pain and Thoracic spondylosis          Insurance (Authorized # of Visits):  Sean Gamboa (30 visits; 10 chiro prior to start of PT; 4 additional as of 6/4/2020)           Authorizing Physician: Dr. Cesar Hermosillo MD visit: none scheduled  Fall Risk: sta isometric contraction without requiring verbal or tactile cuing to promote advancement of therex   · Pt will demonstrate good understanding of proper posture and body mechanics to decrease pain with standing >15 minutes to shower  · Pt will have decreased on SB TA 5s hold x8  -with march (2 UE) 2x15  -with RTB rows x15  Standing on airex TA with march x20 (fair) Neuro Re-ed  Seated on SB TA   -with march x20  -dead bug x15  -with RTB rows 2x12  -1 LE extended with 1# bicep curls (R heel touch) 2x10  -modifi

## 2020-06-10 ENCOUNTER — APPOINTMENT (OUTPATIENT)
Dept: PHYSICAL THERAPY | Age: 60
End: 2020-06-10
Attending: FAMILY MEDICINE
Payer: COMMERCIAL

## 2020-06-17 ENCOUNTER — TELEPHONE (OUTPATIENT)
Dept: PHYSICAL THERAPY | Age: 60
End: 2020-06-17

## 2020-06-17 NOTE — TELEPHONE ENCOUNTER
Jodi Shafer called to cancel 8:30am appt June 18. She has appt w/ Dr. Isaac Davis On Jun 25th will follow up w/ PT after her appt. She hopes to get doctor to write a letter to get approval for more visits.

## 2020-06-18 ENCOUNTER — APPOINTMENT (OUTPATIENT)
Dept: PHYSICAL THERAPY | Age: 60
End: 2020-06-18
Attending: FAMILY MEDICINE
Payer: COMMERCIAL

## 2020-07-08 ENCOUNTER — TELEPHONE (OUTPATIENT)
Dept: PHYSICAL THERAPY | Age: 60
End: 2020-07-08

## 2020-07-08 NOTE — TELEPHONE ENCOUNTER
Called to follow-up. Pt reports that her MD appt with Dr. Justina Norman was rescheduled from 6/45 to 8/6. She was also ordered a thoracic and lumbar MRI by another surgeon, which she is scheduled to obtain at the end of this month.  She reports that she is still d

## 2020-08-03 ENCOUNTER — ORDER TRANSCRIPTION (OUTPATIENT)
Dept: PHYSICAL THERAPY | Age: 60
End: 2020-08-03

## 2020-08-03 DIAGNOSIS — M72.2 PLANTAR FASCIAL FIBROMATOSIS: Primary | ICD-10-CM

## 2020-08-04 ENCOUNTER — OFFICE VISIT (OUTPATIENT)
Dept: PHYSICAL THERAPY | Age: 60
End: 2020-08-04
Attending: ORTHOPAEDIC SURGERY
Payer: COMMERCIAL

## 2020-08-04 DIAGNOSIS — M72.2 PLANTAR FASCIAL FIBROMATOSIS: ICD-10-CM

## 2020-08-04 PROCEDURE — 97140 MANUAL THERAPY 1/> REGIONS: CPT

## 2020-08-04 PROCEDURE — 97161 PT EVAL LOW COMPLEX 20 MIN: CPT

## 2020-08-04 PROCEDURE — 97110 THERAPEUTIC EXERCISES: CPT

## 2020-08-04 NOTE — PROGRESS NOTES
LOWER EXTREMITY EVALUATION:   Referring Physician: Dr. Radha Plascencia  Diagnosis: YONATHAN Plantar fasciitis    Date of Service: 8/4/2020     PATIENT SUMMARY   Verona Jean-Baptiste is a 63year old female who presents to therapy today with complaints of pain in the heels> impairment. Vanna Urbano presents to physical therapy evaluation with primary c/o pain in YONATHAN feet, worst in her heels.  The results of the objective tests and measures show overall good ankle AROM, but decreased accessory motion throughout ankle, of feet YONATHAN    Today’s Treatment and Response:   Pt education was provided on exam findings, treatment diagnosis, treatment plan, expectations, and prognosis.  Pt was also provided recommendations for use of night splint (try one at a time) for low load, lo and has agreed to actively participate in planning and for this course of care. Thank you for your referral. Please co-sign or sign and return this letter via fax as soon as possible to [phone].  If you have any questions, please contact me at Dept:

## 2020-08-05 ENCOUNTER — APPOINTMENT (OUTPATIENT)
Dept: PHYSICAL THERAPY | Age: 60
End: 2020-08-05
Payer: COMMERCIAL

## 2020-08-06 ENCOUNTER — APPOINTMENT (OUTPATIENT)
Dept: PHYSICAL THERAPY | Age: 60
End: 2020-08-06
Attending: ORTHOPAEDIC SURGERY
Payer: COMMERCIAL

## 2020-08-10 ENCOUNTER — TELEPHONE (OUTPATIENT)
Dept: PHYSICAL THERAPY | Age: 60
End: 2020-08-10

## 2020-08-10 ENCOUNTER — APPOINTMENT (OUTPATIENT)
Dept: PHYSICAL THERAPY | Age: 60
End: 2020-08-10
Attending: ORTHOPAEDIC SURGERY
Payer: COMMERCIAL

## 2020-08-10 NOTE — PROGRESS NOTES
Dx: YONATHAN Plantar fasciitis         Insurance (Authorized # of Visits):  6           Authorizing Physician: Dr. Radha Hermosillo MD visit: none scheduled  Fall Risk: standard         Precautions: n/a             Subjective: ***    Objective:     Balance: SLS R 2s,

## 2020-08-12 ENCOUNTER — APPOINTMENT (OUTPATIENT)
Dept: PHYSICAL THERAPY | Age: 60
End: 2020-08-12
Attending: ORTHOPAEDIC SURGERY
Payer: COMMERCIAL

## 2020-08-18 ENCOUNTER — OFFICE VISIT (OUTPATIENT)
Dept: PHYSICAL THERAPY | Age: 60
End: 2020-08-18
Attending: ORTHOPAEDIC SURGERY
Payer: COMMERCIAL

## 2020-08-18 PROCEDURE — 97140 MANUAL THERAPY 1/> REGIONS: CPT

## 2020-08-18 PROCEDURE — 97110 THERAPEUTIC EXERCISES: CPT

## 2020-08-18 NOTE — PROGRESS NOTES
Dx: [employer] fasciitis           Insurance (Authorized # of Visits):  Blaise Zavaleta (30 visits/year; ~24 used prior to this episode of care- PT note to insurance to request more)        Authorizing Physician: Dr. Ruthy Newby  Next MD visit: 9/10/20  Fall Risk: standard II) 3x30s ea    gentle toe distraction, gentle midfoot splay, first ray dorsal and plantar glides x10 min     IASTM YONATHAN plantar fascia R/L x6 min    Kinesiotape YONATHAN \"I\" tape to achilles and calcaneus with \"fan\" or \"I\"to plantar fascia (reviewed for s

## 2020-08-20 ENCOUNTER — APPOINTMENT (OUTPATIENT)
Dept: PHYSICAL THERAPY | Age: 60
End: 2020-08-20
Attending: ORTHOPAEDIC SURGERY
Payer: COMMERCIAL

## 2020-08-25 ENCOUNTER — APPOINTMENT (OUTPATIENT)
Dept: PHYSICAL THERAPY | Age: 60
End: 2020-08-25
Attending: ORTHOPAEDIC SURGERY
Payer: COMMERCIAL

## 2020-08-27 ENCOUNTER — OFFICE VISIT (OUTPATIENT)
Dept: PHYSICAL THERAPY | Age: 60
End: 2020-08-27
Attending: ORTHOPAEDIC SURGERY
Payer: COMMERCIAL

## 2020-08-27 ENCOUNTER — APPOINTMENT (OUTPATIENT)
Dept: PHYSICAL THERAPY | Age: 60
End: 2020-08-27
Payer: COMMERCIAL

## 2020-08-27 PROCEDURE — 97140 MANUAL THERAPY 1/> REGIONS: CPT

## 2020-08-27 PROCEDURE — 97112 NEUROMUSCULAR REEDUCATION: CPT

## 2020-08-27 PROCEDURE — 97110 THERAPEUTIC EXERCISES: CPT

## 2020-08-27 NOTE — PROGRESS NOTES
Dx: [employer] fasciitis           Insurance (Authorized # of Visits):  [facility] (30 visits/year; ~24 used prior to this episode of care- PT note to insurance to request more)        Authorizing Physician: Dr. Catherine Olsen  Next MD visit: 9/10/20  Fall Risk: standard R/L x6 min    Kinesiotape YONATHAN \"I\" tape to achilles and calcaneus with \"fan\" or \"I\"to plantar fascia (reviewed for self application) Manual Therapy  Seated TC distraction in prone (gr II) 3x30s ea    gentle toe distraction, gentle midfoot splay, first

## 2020-09-01 ENCOUNTER — OFFICE VISIT (OUTPATIENT)
Dept: PHYSICAL THERAPY | Age: 60
End: 2020-09-01
Attending: ORTHOPAEDIC SURGERY
Payer: COMMERCIAL

## 2020-09-01 PROCEDURE — 97140 MANUAL THERAPY 1/> REGIONS: CPT

## 2020-09-01 NOTE — PROGRESS NOTES
Dx: [employer] fasciitis           Insurance (Authorized # of Visits):  [employer] (30 visits/year; ~24 used prior to this episode of care- PT note to insurance to request more)        Authorizing Physician: Dr. Nereyda Lucero  Next MD visit: 9/10/20  Fall Risk: standard TX#: 5/ Date:    Tx#: 6/   Manual Therapy  gentle TC distraction in prone (gr II) 3x30s ea    gentle toe distraction, gentle midfoot splay, first ray dorsal and plantar glides x10 min     IASTM YONATHAN plantar fascia R/L x6 min    Kinesiotape YONATHAN \"I\"

## 2020-09-03 ENCOUNTER — APPOINTMENT (OUTPATIENT)
Dept: PHYSICAL THERAPY | Age: 60
End: 2020-09-03
Payer: COMMERCIAL

## 2020-09-03 ENCOUNTER — TELEPHONE (OUTPATIENT)
Dept: PHYSICAL THERAPY | Age: 60
End: 2020-09-03

## 2020-09-08 ENCOUNTER — OFFICE VISIT (OUTPATIENT)
Dept: PHYSICAL THERAPY | Age: 60
End: 2020-09-08
Attending: ORTHOPAEDIC SURGERY
Payer: COMMERCIAL

## 2020-09-08 PROCEDURE — 97140 MANUAL THERAPY 1/> REGIONS: CPT

## 2020-09-08 NOTE — PROGRESS NOTES
Joseph  Pt has attended 5 visits in Physical Therapy.    Dx: [employer] (Authorized # of Visits):  [facility] (30 visits/year; ~24 used prior to this episode of care)          Authorizing Physician: Dr. Yuliet Hermosillo MD [place] July pain -MET  · Pt will be independent and compliant with comprehensive HEP to maintain progress achieved in PTs -MET    Plan: D/C skilled PT for YONATHAN foot pain with continued compliance to HEP; pt would benefit from resumed PT for her low back pain due to wor R/L x8 min ea    Prone R lumbar paraspinal, scar, and QL STM x15 min    Therex  self plantar fascia stretch seated 2x20s ea    Seated arch lifts x10 ea   [place] pick ups x3 min   seated heel raises x10 ea  Seated tilt board A/P and M/L x15 ea    Standing p

## 2020-09-15 ENCOUNTER — APPOINTMENT (OUTPATIENT)
Dept: PHYSICAL THERAPY | Age: 60
End: 2020-09-15
Attending: ORTHOPAEDIC SURGERY
Payer: COMMERCIAL

## 2020-10-10 RX ORDER — METOCLOPRAMIDE 10 MG/1
10 TABLET ORAL ONCE
Status: CANCELLED | OUTPATIENT
Start: 2020-10-10 | End: 2020-10-10

## 2020-10-12 ENCOUNTER — LAB ENCOUNTER (OUTPATIENT)
Dept: LAB | Age: 60
End: 2020-10-12
Attending: ORTHOPAEDIC SURGERY
Payer: COMMERCIAL

## 2020-10-12 ENCOUNTER — HOSPITAL ENCOUNTER (OUTPATIENT)
Dept: GENERAL RADIOLOGY | Age: 60
Discharge: HOME | End: 2020-10-12
Attending: FAMILY MEDICINE
Payer: COMMERCIAL

## 2020-10-12 DIAGNOSIS — Z01.818 PRE-OP EXAM: ICD-10-CM

## 2020-10-12 DIAGNOSIS — Z01.818 PREOP TESTING: ICD-10-CM

## 2020-10-12 PROCEDURE — 86850 RBC ANTIBODY SCREEN: CPT

## 2020-10-12 PROCEDURE — 71046 X-RAY EXAM CHEST 2 VIEWS: CPT | Performed by: FAMILY MEDICINE

## 2020-10-12 PROCEDURE — 86900 BLOOD TYPING SEROLOGIC ABO: CPT

## 2020-10-12 PROCEDURE — 86901 BLOOD TYPING SEROLOGIC RH(D): CPT

## 2020-10-14 ENCOUNTER — APPOINTMENT (OUTPATIENT)
Dept: GENERAL RADIOLOGY | Facility: HOSPITAL | Age: 60
DRG: 457 | End: 2020-10-14
Attending: ORTHOPAEDIC SURGERY
Payer: COMMERCIAL

## 2020-10-14 ENCOUNTER — ANESTHESIA (OUTPATIENT)
Dept: SURGERY | Facility: HOSPITAL | Age: 60
DRG: 457 | End: 2020-10-14
Payer: COMMERCIAL

## 2020-10-14 ENCOUNTER — HOSPITAL ENCOUNTER (INPATIENT)
Facility: HOSPITAL | Age: 60
LOS: 6 days | Discharge: HOME | DRG: 457 | End: 2020-10-20
Attending: ORTHOPAEDIC SURGERY | Admitting: ORTHOPAEDIC SURGERY
Payer: COMMERCIAL

## 2020-10-14 ENCOUNTER — ANESTHESIA EVENT (OUTPATIENT)
Dept: SURGERY | Facility: HOSPITAL | Age: 60
DRG: 457 | End: 2020-10-14
Payer: COMMERCIAL

## 2020-10-14 DIAGNOSIS — T84.498A MECHANICAL COMPLICATION OF INTERNAL ORTHOPEDIC IMPLANT, INITIAL ENCOUNTER (HCC): ICD-10-CM

## 2020-10-14 DIAGNOSIS — G89.29 CHRONIC BILATERAL LOW BACK PAIN, UNSPECIFIED WHETHER SCIATICA PRESENT: ICD-10-CM

## 2020-10-14 DIAGNOSIS — Z01.818 PREOP TESTING: Primary | ICD-10-CM

## 2020-10-14 DIAGNOSIS — M54.50 CHRONIC BILATERAL LOW BACK PAIN, UNSPECIFIED WHETHER SCIATICA PRESENT: ICD-10-CM

## 2020-10-14 DIAGNOSIS — M40.14 OTHER SECONDARY KYPHOSIS, THORACIC REGION: ICD-10-CM

## 2020-10-14 PROBLEM — M40.204 THORACIC KYPHOSIS: Status: ACTIVE | Noted: 2020-10-14

## 2020-10-14 PROCEDURE — 88300 SURGICAL PATH GROSS: CPT | Performed by: ORTHOPAEDIC SURGERY

## 2020-10-14 PROCEDURE — 76000 FLUOROSCOPY <1 HR PHYS/QHP: CPT | Performed by: ORTHOPAEDIC SURGERY

## 2020-10-14 PROCEDURE — 95939 C MOTOR EVOKED UPR&LWR LIMBS: CPT | Performed by: ORTHOPAEDIC SURGERY

## 2020-10-14 PROCEDURE — 95938 SOMATOSENSORY TESTING: CPT | Performed by: ORTHOPAEDIC SURGERY

## 2020-10-14 PROCEDURE — 95860 NEEDLE EMG 1 EXTREMITY: CPT | Performed by: ORTHOPAEDIC SURGERY

## 2020-10-14 DEVICE — IMPLANTABLE DEVICE: Type: IMPLANTABLE DEVICE | Site: BACK | Status: FUNCTIONAL

## 2020-10-14 DEVICE — SCREW BONE EVEREST 6.5MM 40MM: Type: IMPLANTABLE DEVICE | Site: BACK | Status: FUNCTIONAL

## 2020-10-14 DEVICE — BONE ALLO CANCELOUS CHIP 30CC: Type: IMPLANTABLE DEVICE | Site: BACK | Status: FUNCTIONAL

## 2020-10-14 DEVICE — SCREW BN 6.5MM 45MM VRST SPNE: Type: IMPLANTABLE DEVICE | Site: BACK | Status: FUNCTIONAL

## 2020-10-14 DEVICE — BONE GRAFT KIT 7510400 INFUSE MEDIUM
Type: IMPLANTABLE DEVICE | Site: BACK | Status: FUNCTIONAL
Brand: INFUSE® BONE GRAFT

## 2020-10-14 DEVICE — BONE GRAFT KIT 7510600 INFUSE LARGE
Type: IMPLANTABLE DEVICE | Site: BACK | Status: FUNCTIONAL
Brand: INFUSE® BONE GRAFT

## 2020-10-14 RX ORDER — LIDOCAINE HYDROCHLORIDE 10 MG/ML
INJECTION, SOLUTION EPIDURAL; INFILTRATION; INTRACAUDAL; PERINEURAL AS NEEDED
Status: DISCONTINUED | OUTPATIENT
Start: 2020-10-14 | End: 2020-10-14 | Stop reason: SURG

## 2020-10-14 RX ORDER — HYDROMORPHONE HYDROCHLORIDE 1 MG/ML
0.3 INJECTION, SOLUTION INTRAMUSCULAR; INTRAVENOUS; SUBCUTANEOUS EVERY 2 HOUR PRN
Status: DISCONTINUED | OUTPATIENT
Start: 2020-10-14 | End: 2020-10-16

## 2020-10-14 RX ORDER — GLYCOPYRROLATE 0.2 MG/ML
INJECTION, SOLUTION INTRAMUSCULAR; INTRAVENOUS AS NEEDED
Status: DISCONTINUED | OUTPATIENT
Start: 2020-10-14 | End: 2020-10-14 | Stop reason: SURG

## 2020-10-14 RX ORDER — HYDROMORPHONE HYDROCHLORIDE 1 MG/ML
0.4 INJECTION, SOLUTION INTRAMUSCULAR; INTRAVENOUS; SUBCUTANEOUS EVERY 2 HOUR PRN
Status: DISCONTINUED | OUTPATIENT
Start: 2020-10-14 | End: 2020-10-16

## 2020-10-14 RX ORDER — SCOLOPAMINE TRANSDERMAL SYSTEM 1 MG/1
1 PATCH, EXTENDED RELEASE TRANSDERMAL ONCE
Status: DISCONTINUED | OUTPATIENT
Start: 2020-10-14 | End: 2020-10-17

## 2020-10-14 RX ORDER — VANCOMYCIN HYDROCHLORIDE 1 G/20ML
INJECTION, POWDER, LYOPHILIZED, FOR SOLUTION INTRAVENOUS AS NEEDED
Status: DISCONTINUED | OUTPATIENT
Start: 2020-10-14 | End: 2020-10-14 | Stop reason: HOSPADM

## 2020-10-14 RX ORDER — PROCHLORPERAZINE EDISYLATE 5 MG/ML
5 INJECTION INTRAMUSCULAR; INTRAVENOUS ONCE AS NEEDED
Status: DISCONTINUED | OUTPATIENT
Start: 2020-10-14 | End: 2020-10-14 | Stop reason: HOSPADM

## 2020-10-14 RX ORDER — DIPHENHYDRAMINE HCL 25 MG
25 CAPSULE ORAL EVERY 4 HOURS PRN
Status: DISCONTINUED | OUTPATIENT
Start: 2020-10-14 | End: 2020-10-20

## 2020-10-14 RX ORDER — MORPHINE SULFATE 1 MG/ML
INJECTION, SOLUTION EPIDURAL; INTRATHECAL; INTRAVENOUS AS NEEDED
Status: DISCONTINUED | OUTPATIENT
Start: 2020-10-14 | End: 2020-10-14 | Stop reason: HOSPADM

## 2020-10-14 RX ORDER — NALOXONE HYDROCHLORIDE 0.4 MG/ML
0.08 INJECTION, SOLUTION INTRAMUSCULAR; INTRAVENOUS; SUBCUTANEOUS
Status: DISCONTINUED | OUTPATIENT
Start: 2020-10-14 | End: 2020-10-18

## 2020-10-14 RX ORDER — BUPIVACAINE HYDROCHLORIDE AND EPINEPHRINE 2.5; 5 MG/ML; UG/ML
INJECTION, SOLUTION INFILTRATION; PERINEURAL AS NEEDED
Status: DISCONTINUED | OUTPATIENT
Start: 2020-10-14 | End: 2020-10-14 | Stop reason: HOSPADM

## 2020-10-14 RX ORDER — DIPHENHYDRAMINE HYDROCHLORIDE 50 MG/ML
25 INJECTION INTRAMUSCULAR; INTRAVENOUS EVERY 4 HOURS PRN
Status: DISCONTINUED | OUTPATIENT
Start: 2020-10-14 | End: 2020-10-17

## 2020-10-14 RX ORDER — HYDROMORPHONE HYDROCHLORIDE 1 MG/ML
0.2 INJECTION, SOLUTION INTRAMUSCULAR; INTRAVENOUS; SUBCUTANEOUS EVERY 5 MIN PRN
Status: DISCONTINUED | OUTPATIENT
Start: 2020-10-14 | End: 2020-10-14 | Stop reason: HOSPADM

## 2020-10-14 RX ORDER — EPHEDRINE SULFATE 50 MG/ML
INJECTION, SOLUTION INTRAVENOUS AS NEEDED
Status: DISCONTINUED | OUTPATIENT
Start: 2020-10-14 | End: 2020-10-14 | Stop reason: SURG

## 2020-10-14 RX ORDER — HYDROMORPHONE HYDROCHLORIDE 1 MG/ML
0.4 INJECTION, SOLUTION INTRAMUSCULAR; INTRAVENOUS; SUBCUTANEOUS EVERY 5 MIN PRN
Status: DISCONTINUED | OUTPATIENT
Start: 2020-10-14 | End: 2020-10-14 | Stop reason: HOSPADM

## 2020-10-14 RX ORDER — CALCIUM CARBONATE 200(500)MG
500 TABLET,CHEWABLE ORAL 2 TIMES DAILY WITH MEALS
Status: DISCONTINUED | OUTPATIENT
Start: 2020-10-14 | End: 2020-10-20

## 2020-10-14 RX ORDER — SENNOSIDES 8.6 MG
17.2 TABLET ORAL NIGHTLY
Status: DISCONTINUED | OUTPATIENT
Start: 2020-10-14 | End: 2020-10-20

## 2020-10-14 RX ORDER — NALOXONE HYDROCHLORIDE 0.4 MG/ML
80 INJECTION, SOLUTION INTRAMUSCULAR; INTRAVENOUS; SUBCUTANEOUS AS NEEDED
Status: DISCONTINUED | OUTPATIENT
Start: 2020-10-14 | End: 2020-10-14 | Stop reason: HOSPADM

## 2020-10-14 RX ORDER — ACETAMINOPHEN 325 MG/1
650 TABLET ORAL EVERY 4 HOURS PRN
Status: DISCONTINUED | OUTPATIENT
Start: 2020-10-14 | End: 2020-10-20

## 2020-10-14 RX ORDER — FAMOTIDINE 20 MG/1
20 TABLET ORAL ONCE
Status: COMPLETED | OUTPATIENT
Start: 2020-10-14 | End: 2020-10-14

## 2020-10-14 RX ORDER — MORPHINE SULFATE 4 MG/ML
4 INJECTION, SOLUTION INTRAMUSCULAR; INTRAVENOUS EVERY 10 MIN PRN
Status: DISCONTINUED | OUTPATIENT
Start: 2020-10-14 | End: 2020-10-14 | Stop reason: HOSPADM

## 2020-10-14 RX ORDER — PROCHLORPERAZINE EDISYLATE 5 MG/ML
10 INJECTION INTRAMUSCULAR; INTRAVENOUS EVERY 6 HOURS PRN
Status: DISPENSED | OUTPATIENT
Start: 2020-10-14 | End: 2020-10-16

## 2020-10-14 RX ORDER — CEFAZOLIN SODIUM/WATER 2 G/20 ML
2 SYRINGE (ML) INTRAVENOUS ONCE
Status: COMPLETED | OUTPATIENT
Start: 2020-10-14 | End: 2020-10-14

## 2020-10-14 RX ORDER — HYDRALAZINE HYDROCHLORIDE 25 MG/1
25 TABLET, FILM COATED ORAL EVERY 8 HOURS PRN
Status: DISCONTINUED | OUTPATIENT
Start: 2020-10-14 | End: 2020-10-20

## 2020-10-14 RX ORDER — NEOSTIGMINE METHYLSULFATE 1 MG/ML
INJECTION INTRAVENOUS AS NEEDED
Status: DISCONTINUED | OUTPATIENT
Start: 2020-10-14 | End: 2020-10-14 | Stop reason: SURG

## 2020-10-14 RX ORDER — MIDAZOLAM HYDROCHLORIDE 1 MG/ML
INJECTION INTRAMUSCULAR; INTRAVENOUS AS NEEDED
Status: DISCONTINUED | OUTPATIENT
Start: 2020-10-14 | End: 2020-10-14 | Stop reason: SURG

## 2020-10-14 RX ORDER — BISACODYL 10 MG
10 SUPPOSITORY, RECTAL RECTAL
Status: DISCONTINUED | OUTPATIENT
Start: 2020-10-14 | End: 2020-10-19

## 2020-10-14 RX ORDER — HYDROMORPHONE HYDROCHLORIDE 1 MG/ML
0.2 INJECTION, SOLUTION INTRAMUSCULAR; INTRAVENOUS; SUBCUTANEOUS EVERY 2 HOUR PRN
Status: DISCONTINUED | OUTPATIENT
Start: 2020-10-14 | End: 2020-10-16

## 2020-10-14 RX ORDER — ROCURONIUM BROMIDE 10 MG/ML
INJECTION, SOLUTION INTRAVENOUS AS NEEDED
Status: DISCONTINUED | OUTPATIENT
Start: 2020-10-14 | End: 2020-10-14 | Stop reason: SURG

## 2020-10-14 RX ORDER — DIPHENHYDRAMINE HYDROCHLORIDE 50 MG/ML
12.5 INJECTION INTRAMUSCULAR; INTRAVENOUS EVERY 4 HOURS PRN
Status: DISCONTINUED | OUTPATIENT
Start: 2020-10-14 | End: 2020-10-16

## 2020-10-14 RX ORDER — HYDROCODONE BITARTRATE AND ACETAMINOPHEN 10; 325 MG/1; MG/1
2 TABLET ORAL EVERY 4 HOURS PRN
Status: DISCONTINUED | OUTPATIENT
Start: 2020-10-14 | End: 2020-10-15

## 2020-10-14 RX ORDER — ACETAMINOPHEN 500 MG
1000 TABLET ORAL ONCE
Status: COMPLETED | OUTPATIENT
Start: 2020-10-14 | End: 2020-10-14

## 2020-10-14 RX ORDER — FENOFIBRATE 134 MG/1
134 CAPSULE ORAL
Status: DISCONTINUED | OUTPATIENT
Start: 2020-10-15 | End: 2020-10-20

## 2020-10-14 RX ORDER — SODIUM CHLORIDE, SODIUM LACTATE, POTASSIUM CHLORIDE, CALCIUM CHLORIDE 600; 310; 30; 20 MG/100ML; MG/100ML; MG/100ML; MG/100ML
INJECTION, SOLUTION INTRAVENOUS CONTINUOUS
Status: DISCONTINUED | OUTPATIENT
Start: 2020-10-14 | End: 2020-10-14 | Stop reason: ALTCHOICE

## 2020-10-14 RX ORDER — POLYETHYLENE GLYCOL 3350 17 G/17G
17 POWDER, FOR SOLUTION ORAL DAILY PRN
Status: DISCONTINUED | OUTPATIENT
Start: 2020-10-14 | End: 2020-10-19

## 2020-10-14 RX ORDER — SODIUM CHLORIDE, SODIUM LACTATE, POTASSIUM CHLORIDE, CALCIUM CHLORIDE 600; 310; 30; 20 MG/100ML; MG/100ML; MG/100ML; MG/100ML
INJECTION, SOLUTION INTRAVENOUS CONTINUOUS
Status: DISCONTINUED | OUTPATIENT
Start: 2020-10-14 | End: 2020-10-16

## 2020-10-14 RX ORDER — HYDROCODONE BITARTRATE AND ACETAMINOPHEN 10; 325 MG/1; MG/1
1 TABLET ORAL EVERY 4 HOURS PRN
Status: DISCONTINUED | OUTPATIENT
Start: 2020-10-14 | End: 2020-10-15

## 2020-10-14 RX ORDER — ASCORBIC ACID 500 MG
1000 TABLET ORAL 2 TIMES DAILY WITH MEALS
Status: DISCONTINUED | OUTPATIENT
Start: 2020-10-14 | End: 2020-10-20

## 2020-10-14 RX ORDER — SODIUM CHLORIDE 9 MG/ML
INJECTION, SOLUTION INTRAVENOUS CONTINUOUS PRN
Status: DISCONTINUED | OUTPATIENT
Start: 2020-10-14 | End: 2020-10-14 | Stop reason: SURG

## 2020-10-14 RX ORDER — PHENYLEPHRINE HCL 10 MG/ML
VIAL (ML) INJECTION AS NEEDED
Status: DISCONTINUED | OUTPATIENT
Start: 2020-10-14 | End: 2020-10-14 | Stop reason: SURG

## 2020-10-14 RX ORDER — ONDANSETRON 2 MG/ML
4 INJECTION INTRAMUSCULAR; INTRAVENOUS ONCE AS NEEDED
Status: DISCONTINUED | OUTPATIENT
Start: 2020-10-14 | End: 2020-10-14 | Stop reason: HOSPADM

## 2020-10-14 RX ORDER — DOCUSATE SODIUM 100 MG/1
100 CAPSULE, LIQUID FILLED ORAL 2 TIMES DAILY
Status: DISCONTINUED | OUTPATIENT
Start: 2020-10-14 | End: 2020-10-20

## 2020-10-14 RX ORDER — TIZANIDINE 4 MG/1
4 TABLET ORAL 3 TIMES DAILY PRN
Status: DISCONTINUED | OUTPATIENT
Start: 2020-10-14 | End: 2020-10-20

## 2020-10-14 RX ORDER — SODIUM PHOSPHATE, DIBASIC AND SODIUM PHOSPHATE, MONOBASIC 7; 19 G/133ML; G/133ML
1 ENEMA RECTAL ONCE AS NEEDED
Status: DISCONTINUED | OUTPATIENT
Start: 2020-10-14 | End: 2020-10-20

## 2020-10-14 RX ORDER — DEXAMETHASONE SODIUM PHOSPHATE 4 MG/ML
VIAL (ML) INJECTION AS NEEDED
Status: DISCONTINUED | OUTPATIENT
Start: 2020-10-14 | End: 2020-10-14 | Stop reason: SURG

## 2020-10-14 RX ORDER — ONDANSETRON 2 MG/ML
4 INJECTION INTRAMUSCULAR; INTRAVENOUS EVERY 4 HOURS PRN
Status: DISPENSED | OUTPATIENT
Start: 2020-10-14 | End: 2020-10-15

## 2020-10-14 RX ORDER — ALPRAZOLAM 0.25 MG/1
0.25 TABLET ORAL 4 TIMES DAILY PRN
Status: DISCONTINUED | OUTPATIENT
Start: 2020-10-14 | End: 2020-10-14

## 2020-10-14 RX ORDER — ONDANSETRON 2 MG/ML
INJECTION INTRAMUSCULAR; INTRAVENOUS AS NEEDED
Status: DISCONTINUED | OUTPATIENT
Start: 2020-10-14 | End: 2020-10-14 | Stop reason: SURG

## 2020-10-14 RX ORDER — HYDROCODONE BITARTRATE AND ACETAMINOPHEN 5; 325 MG/1; MG/1
1 TABLET ORAL AS NEEDED
Status: DISCONTINUED | OUTPATIENT
Start: 2020-10-14 | End: 2020-10-14 | Stop reason: HOSPADM

## 2020-10-14 RX ORDER — MORPHINE SULFATE 10 MG/ML
6 INJECTION, SOLUTION INTRAMUSCULAR; INTRAVENOUS EVERY 10 MIN PRN
Status: DISCONTINUED | OUTPATIENT
Start: 2020-10-14 | End: 2020-10-14 | Stop reason: HOSPADM

## 2020-10-14 RX ORDER — ONDANSETRON 2 MG/ML
4 INJECTION INTRAMUSCULAR; INTRAVENOUS EVERY 6 HOURS PRN
Status: DISCONTINUED | OUTPATIENT
Start: 2020-10-14 | End: 2020-10-19

## 2020-10-14 RX ORDER — CEFAZOLIN SODIUM/WATER 2 G/20 ML
2 SYRINGE (ML) INTRAVENOUS EVERY 8 HOURS
Status: COMPLETED | OUTPATIENT
Start: 2020-10-14 | End: 2020-10-15

## 2020-10-14 RX ORDER — MORPHINE SULFATE 4 MG/ML
2 INJECTION, SOLUTION INTRAMUSCULAR; INTRAVENOUS EVERY 10 MIN PRN
Status: DISCONTINUED | OUTPATIENT
Start: 2020-10-14 | End: 2020-10-14 | Stop reason: HOSPADM

## 2020-10-14 RX ORDER — LOSARTAN POTASSIUM 100 MG/1
100 TABLET ORAL DAILY
Status: DISCONTINUED | OUTPATIENT
Start: 2020-10-15 | End: 2020-10-15

## 2020-10-14 RX ORDER — DEXAMETHASONE SODIUM PHOSPHATE 10 MG/ML
10 INJECTION, SOLUTION INTRAMUSCULAR; INTRAVENOUS ONCE
Status: DISCONTINUED | OUTPATIENT
Start: 2020-10-15 | End: 2020-10-15

## 2020-10-14 RX ORDER — DIAZEPAM 5 MG/1
5 TABLET ORAL EVERY 6 HOURS PRN
Status: DISCONTINUED | OUTPATIENT
Start: 2020-10-14 | End: 2020-10-20

## 2020-10-14 RX ORDER — DIAZEPAM 5 MG/1
5 TABLET ORAL ONCE AS NEEDED
Status: ACTIVE | OUTPATIENT
Start: 2020-10-14 | End: 2020-10-14

## 2020-10-14 RX ORDER — HYDROCODONE BITARTRATE AND ACETAMINOPHEN 5; 325 MG/1; MG/1
2 TABLET ORAL AS NEEDED
Status: DISCONTINUED | OUTPATIENT
Start: 2020-10-14 | End: 2020-10-14 | Stop reason: HOSPADM

## 2020-10-14 RX ORDER — SODIUM CHLORIDE, SODIUM LACTATE, POTASSIUM CHLORIDE, CALCIUM CHLORIDE 600; 310; 30; 20 MG/100ML; MG/100ML; MG/100ML; MG/100ML
INJECTION, SOLUTION INTRAVENOUS CONTINUOUS
Status: DISCONTINUED | OUTPATIENT
Start: 2020-10-14 | End: 2020-10-14 | Stop reason: HOSPADM

## 2020-10-14 RX ORDER — NALOXONE HYDROCHLORIDE 0.4 MG/ML
0.08 INJECTION, SOLUTION INTRAMUSCULAR; INTRAVENOUS; SUBCUTANEOUS
Status: DISCONTINUED | OUTPATIENT
Start: 2020-10-14 | End: 2020-10-20

## 2020-10-14 RX ORDER — HYDROMORPHONE HYDROCHLORIDE 1 MG/ML
0.6 INJECTION, SOLUTION INTRAMUSCULAR; INTRAVENOUS; SUBCUTANEOUS EVERY 5 MIN PRN
Status: DISCONTINUED | OUTPATIENT
Start: 2020-10-14 | End: 2020-10-14 | Stop reason: HOSPADM

## 2020-10-14 RX ADMIN — EPHEDRINE SULFATE 10 MG: 50 INJECTION, SOLUTION INTRAVENOUS at 11:01:00

## 2020-10-14 RX ADMIN — NEOSTIGMINE METHYLSULFATE 4 MG: 1 INJECTION INTRAVENOUS at 12:46:00

## 2020-10-14 RX ADMIN — LIDOCAINE HYDROCHLORIDE 50 MG: 10 INJECTION, SOLUTION EPIDURAL; INFILTRATION; INTRACAUDAL; PERINEURAL at 07:44:00

## 2020-10-14 RX ADMIN — SODIUM CHLORIDE: 9 INJECTION, SOLUTION INTRAVENOUS at 11:01:00

## 2020-10-14 RX ADMIN — SODIUM CHLORIDE, SODIUM LACTATE, POTASSIUM CHLORIDE, CALCIUM CHLORIDE: 600; 310; 30; 20 INJECTION, SOLUTION INTRAVENOUS at 10:16:00

## 2020-10-14 RX ADMIN — SODIUM CHLORIDE, SODIUM LACTATE, POTASSIUM CHLORIDE, CALCIUM CHLORIDE: 600; 310; 30; 20 INJECTION, SOLUTION INTRAVENOUS at 12:42:00

## 2020-10-14 RX ADMIN — PHENYLEPHRINE HCL 100 MCG: 10 MG/ML VIAL (ML) INJECTION at 08:08:00

## 2020-10-14 RX ADMIN — PHENYLEPHRINE HCL 100 MCG: 10 MG/ML VIAL (ML) INJECTION at 10:03:00

## 2020-10-14 RX ADMIN — EPHEDRINE SULFATE 10 MG: 50 INJECTION, SOLUTION INTRAVENOUS at 08:16:00

## 2020-10-14 RX ADMIN — PHENYLEPHRINE HCL 100 MCG: 10 MG/ML VIAL (ML) INJECTION at 11:59:00

## 2020-10-14 RX ADMIN — SODIUM CHLORIDE, SODIUM LACTATE, POTASSIUM CHLORIDE, CALCIUM CHLORIDE: 600; 310; 30; 20 INJECTION, SOLUTION INTRAVENOUS at 07:38:00

## 2020-10-14 RX ADMIN — MIDAZOLAM HYDROCHLORIDE 2 MG: 1 INJECTION INTRAMUSCULAR; INTRAVENOUS at 07:38:00

## 2020-10-14 RX ADMIN — ROCURONIUM BROMIDE 10 MG: 10 INJECTION, SOLUTION INTRAVENOUS at 07:44:00

## 2020-10-14 RX ADMIN — SODIUM CHLORIDE, SODIUM LACTATE, POTASSIUM CHLORIDE, CALCIUM CHLORIDE: 600; 310; 30; 20 INJECTION, SOLUTION INTRAVENOUS at 11:01:00

## 2020-10-14 RX ADMIN — ONDANSETRON 4 MG: 2 INJECTION INTRAMUSCULAR; INTRAVENOUS at 08:05:00

## 2020-10-14 RX ADMIN — DEXAMETHASONE SODIUM PHOSPHATE 4 MG: 4 MG/ML VIAL (ML) INJECTION at 08:05:00

## 2020-10-14 RX ADMIN — ROCURONIUM BROMIDE 30 MG: 10 INJECTION, SOLUTION INTRAVENOUS at 08:31:00

## 2020-10-14 RX ADMIN — SODIUM CHLORIDE: 9 INJECTION, SOLUTION INTRAVENOUS at 08:05:00

## 2020-10-14 RX ADMIN — SODIUM CHLORIDE: 9 INJECTION, SOLUTION INTRAVENOUS at 10:23:00

## 2020-10-14 RX ADMIN — GLYCOPYRROLATE 0.6 MG: 0.2 INJECTION, SOLUTION INTRAMUSCULAR; INTRAVENOUS at 12:46:00

## 2020-10-14 RX ADMIN — PHENYLEPHRINE HCL 100 MCG: 10 MG/ML VIAL (ML) INJECTION at 07:48:00

## 2020-10-14 RX ADMIN — EPHEDRINE SULFATE 10 MG: 50 INJECTION, SOLUTION INTRAVENOUS at 11:22:00

## 2020-10-14 RX ADMIN — GLYCOPYRROLATE 0.1 MG: 0.2 INJECTION, SOLUTION INTRAMUSCULAR; INTRAVENOUS at 07:44:00

## 2020-10-14 RX ADMIN — CEFAZOLIN SODIUM/WATER 2 G: 2 G/20 ML SYRINGE (ML) INTRAVENOUS at 08:12:00

## 2020-10-14 RX ADMIN — SODIUM CHLORIDE, SODIUM LACTATE, POTASSIUM CHLORIDE, CALCIUM CHLORIDE: 600; 310; 30; 20 INJECTION, SOLUTION INTRAVENOUS at 12:01:00

## 2020-10-14 RX ADMIN — PHENYLEPHRINE HCL 100 MCG: 10 MG/ML VIAL (ML) INJECTION at 08:13:00

## 2020-10-14 RX ADMIN — CEFAZOLIN SODIUM/WATER 2 G: 2 G/20 ML SYRINGE (ML) INTRAVENOUS at 12:12:00

## 2020-10-14 NOTE — H&P
Progress Notes  Lulu Osei DO (Physician) • • Family Medicine     Yobany Gregory is a 63year old female who presents for a pre-operative physical exam. Patient is to have T3-L2 posterior spinal fusion, to be done by Dr. Jessica Melgoza at [facility] on 10/14 • Thyroid nodule     • Unspecified sleep apnea  DX 12-13-12 DX 2-19-15     AHI 20 RDI 24 REM AHI 6  SaO2 therese 83%// AHI 5 RDI 5 REM AHI 21 SaO2 therese 81 %   • Visual impairment       glasses             Past Surgical History:   Procedure Laterality Date     • Cancer Sister           CA of white blood cells   • Heart Disease Neg         Social History:   Social History    Tobacco Use      Smoking status: Former Smoker        Packs/day: 0.33        Years: 5.00        Pack years: 1.65        Types: Cigarette Ginna on 10/14/2020. Pt has the following conditions: Hypertension. Pt has no significant history of pulmonary conditions. Pt is a good surgical candidate. This consult was sent back the referring physician, Dr. Lela Wong

## 2020-10-14 NOTE — CONSULTS
[facility] Hospitalist Team  History and Physical  Admit Date:  10/14/20    ASSESSMENT / PLAN:   62 yo female  with  OA, ANGELINE-resolved after tonsillectomy, obesity-bmi 39, anxiety/depression/OCD,  HTN, HL,  DDD T8-T12 with retained hardware L1-L4 and kyphosis  Who  pt has issue with post op N/V also per medical hx some issues with being \"slow to wake up and hypotension post op\"  Per  plan may be d/c on Sunday, original plan was friday    OBJECTIVE:  /84   Pulse 94   Temp 97.8 °F (36.6 °C)   Res 5/28/2020    Performed by Harris Maxwell MD at [facility]   • Nichole Pollard 5 / Trula Bar Left 2/28/2017    Performed by Harris Maxwell MD at [address] for fibroids and ovarian cysts   • LUMBAR tablet, Rfl: 0    •  ergocalciferol 1.25 MG (77202 UT) Oral Cap, Take 1 capsule (50,000 Units total) by mouth twice a week., Disp: 24 capsule, Rfl: 1    •  PREMARIN 0.625 MG Oral Tab, TAKE 1 TABLET BY MOUTH DAILY (Patient taking differently: Take 0.625 mg EOSIN#    No results for input(s): NA, K, CL, CO2, BUN, CREATSERUM, GLU, CA, CAION, MG, PHOS in the last 168 hours. No results for input(s): ALT, AST, ALB, AMYLASE, LIPASE, LDH in the last 168 hours.     Invalid input(s): ALPHOS, TBIL, DBIL, TPROT    No instrumentation T3-pelvis  -IV/PO prn pain meds.   Monitor mental status and vitals closely  -expected acute blood loss anemia, preop hemoglobin per outpatient chart review 13.8  -loja removal tomorrow at 12 noon per ortho  -drain x 2  -Bowel reg, antiemet

## 2020-10-14 NOTE — ANESTHESIA PREPROCEDURE EVALUATION
Anesthesia PreOp Note    HPI:     Tono Proctor is a 63year old female who presents for preoperative consultation requested by:  Tino Arnold MD    Date of Surgery: 10/14/2020    Procedure(s):  THORACIC SPINAL FUSION 5 LEVEL  Indication: Other secon Osteoarthritis of both ankles and feet         Date Noted: 02/05/2013      Patellar tendonitis         Date Noted: 04/11/2011      Unspecified hypothyroidism         Date Noted: 09/13/2010      Pure hypercholesterolemia         Date Noted: 09/13/2010 OTHER Left     THUMB JOINT REPLACED   • OTHER SURGICAL HISTORY  10/5/12    lateral sphincterotomy, anoscopy, biopsy of fissure, excision of anal tag by Dr. Alla Hernandez @ [facility]   • OTHER SURGICAL HISTORY Left 2017    Aia 16 OA surgery L   • REDUCTION LEFT  8 time    •  Sertraline HCl 50 MG Oral Tab, Take 1 tablet (50 mg total) by mouth once daily.  (Patient taking differently: Take 50 mg by mouth nightly.  ), Disp: 90 tablet, Rfl: 2, Past Week at Unknown time    •  FENOFIBRATE 160 MG Oral Tab, TAKE 1 TABLET BY Number of children: Not on file      Years of education: Not on file      Highest education level: Not on file    Occupational History      Not on file    Social Needs      Financial resource strain: Not on file      Food insecurity        Worry: Not on f Date     09/24/2020    K 4.65 09/24/2020     09/24/2020    CO2 28.3 09/24/2020    BUN 20.0 09/24/2020    CREATSERUM 0.52 09/24/2020    GLU 99 09/24/2020    CA 9.3 09/24/2020     Lab Results   Component Value Date    INR 0.9 09/24/2020       Vit

## 2020-10-14 NOTE — RESPIRATORY THERAPY NOTE
Philippe Salvador ANGELINE ASSESSMENT:    Pt does have a previous diagnosis of ANGELINE. Pt does not routinely use a CPAP device at home.  This pt is not suspected to be at high risk for ANGELINE     Per pt used to use CPAP at home, but she had a tonsil surgery and does not need to use an

## 2020-10-14 NOTE — ANESTHESIA PROCEDURE NOTES
Airway  Date/Time: 10/14/2020 7:46 AM  Urgency: Elective    Airway not difficult    General Information and Staff    Patient location during procedure: OR  Anesthesiologist: Debra Rivera MD  Resident/CRNA: Obed Mckinley, CRNA  Performed: CRNA

## 2020-10-14 NOTE — PROGRESS NOTES
Diovan 160mg has been substituted with Losartan 100mg per P [employer] T Committee Protocol.     Luis Ruvalcaba, PharmD   [facility] Staff Pharmacist  #[phone]

## 2020-10-14 NOTE — OPERATIVE REPORT
Pre-postop dx:  DDD T8-T12 with retained hardware L1-4, kyphosis.     Proc:  Removal hardware L1-4, T3-L1 osteotomies (Leyva-Encarnacion), PSF T3-L1, pelvic fixation, K2 instrumentation T3-pelvis, intrathecal block, local bone graft, allograft  Co-surgeons:  ÁLVARO

## 2020-10-14 NOTE — ANESTHESIA POSTPROCEDURE EVALUATION
Patient: Gary Grimaldo    Procedure Summary     Date: 10/14/20 Room / Location: [facility] OR 09 / [facility] OR    Anesthesia Start: 7849 Anesthesia Stop: 3113    Procedure: THORACIC SPINAL FUSION 5 LEVEL (N/A ) Diagnosis:       Other secondary kyphosis,

## 2020-10-15 PROCEDURE — 97535 SELF CARE MNGMENT TRAINING: CPT

## 2020-10-15 PROCEDURE — 97165 OT EVAL LOW COMPLEX 30 MIN: CPT

## 2020-10-15 PROCEDURE — 85027 COMPLETE CBC AUTOMATED: CPT | Performed by: PHYSICIAN ASSISTANT

## 2020-10-15 PROCEDURE — 97116 GAIT TRAINING THERAPY: CPT

## 2020-10-15 PROCEDURE — 85025 COMPLETE CBC W/AUTO DIFF WBC: CPT | Performed by: NURSE PRACTITIONER

## 2020-10-15 PROCEDURE — 97162 PT EVAL MOD COMPLEX 30 MIN: CPT

## 2020-10-15 PROCEDURE — 80048 BASIC METABOLIC PNL TOTAL CA: CPT | Performed by: NURSE PRACTITIONER

## 2020-10-15 RX ORDER — OXYCODONE AND ACETAMINOPHEN 10; 325 MG/1; MG/1
2 TABLET ORAL EVERY 4 HOURS PRN
Status: DISCONTINUED | OUTPATIENT
Start: 2020-10-15 | End: 2020-10-17

## 2020-10-15 RX ORDER — OXYCODONE AND ACETAMINOPHEN 10; 325 MG/1; MG/1
1 TABLET ORAL EVERY 4 HOURS PRN
Status: DISCONTINUED | OUTPATIENT
Start: 2020-10-15 | End: 2020-10-17

## 2020-10-15 RX ORDER — DEXAMETHASONE SODIUM PHOSPHATE 10 MG/ML
10 INJECTION, SOLUTION INTRAMUSCULAR; INTRAVENOUS ONCE
Status: COMPLETED | OUTPATIENT
Start: 2020-10-16 | End: 2020-10-16

## 2020-10-15 NOTE — OCCUPATIONAL THERAPY NOTE
OCCUPATIONAL THERAPY EVALUATION - INPATIENT      Room Number: 790/891-X  Evaluation Date: 10/15/2020  Type of Evaluation: Initial  Presenting Problem: T3-L2 posterior spinal fusion.      Physician Order: IP Consult to Occupational Therapy  Reason for [address] home with intermittent supervision once pain is managed. DISCHARGE RECOMMENDATIONS  OT Discharge Recommendations: Home; Intermittent Supervision       PLAN  OT Treatment Plan: Balance activities; Energy conservation/work simplification techniques;ADL tra LUMBAR FACET INJECTION OR MEDIAL BRANCH NERVE BLOCK Bilateral 10/19/2015    Performed by Lillian Haskins MD at [address]   • LUMBAR FACET INJECTION OR MEDIAL BRANCH NERVE BLOCK Bilateral 9/11/2015    Performed by Lillian Haskins MD much help from another person does the patient currently need…  -   Putting on and taking off regular lower body clothing?: A Little  -   Bathing (including washing, rinsing, drying)?: A Little  -   Toileting, which includes using toilet, bedpan or urinal?

## 2020-10-15 NOTE — OPERATIVE REPORT
[facility]    PATIENT'S NAME: Deedee VERNON   ATTENDING PHYSICIAN: Sarah Orta. Jessica Melgoza MD   OPERATING PHYSICIAN: Sarah Orta.  Jessica Melgoza MD   PATIENT ACCOUNT#:   [id number]    LOCATION:  [address] [place] [address] #:   [id number]       DATE OF BI paralysis, nonunion, degeneration a level above with time, DVT, PE, and anesthetic risks. She appears to understand and has elected to proceed.       OPERATIVE TECHNIQUE:  The patient was given uncomplicated general endotracheal anesthesia and placed prone spine from T3 to L1.  K2 medical pedicle screws were placed from T3 to L5 bilaterally and all screws checked with triggered EMGs and all were within threshold limits.   An Infuse 1 large and 1 medium kit were then placed bilaterally from T3 to L1 followed b

## 2020-10-15 NOTE — PROGRESS NOTES
[facility]  Progress Note    Ashanti Don Patient Status:  Inpatient    1960 MRN [id number]   Location [facility] 4W/SW/SE Attending Rashad Amin MD   [address] Day # 1 PCP Carmella Diaz DO     Subjective:  Ashanti Don is a( 10/15/2020    CREATSERUM 0.57 10/15/2020     10/15/2020    CA 8.5 10/15/2020         Assessment/Plan:  S/p T3-pelvis fusion with instrumentation    Mechanical DVT prophylaxis (MARTIN/ SCDs)  Mobilize patient, PT/OT   OOB with brace - TLSO  Clamp drain

## 2020-10-15 NOTE — PHYSICAL THERAPY NOTE
PHYSICAL THERAPY EVALUATION - INPATIENT     Room Number: 734/131-S  Evaluation Date: 10/15/2020  Type of Evaluation: Initial   Physician Order: PT Eval and Treat    Presenting Problem: Spinal fusion T3-L1  Reason for Therapy: Mobility Dysfunction and Disc factor is pain right now. The patient's Approx Degree of Impairment: 54.16% has been calculated based on documentation in the [facility] '6 clicks' Inpatient Basic Mobility Short Form.   Research supports that patients with this level of impairment may benefi Procedure Laterality Date   • BACK SURGERY  2005,2013    microdisectomy-lumbar-fusion   • BACK SURGERY  5/17/16    Hardward removal L1-5; fusion L1-4   • CHOLECYSTECTOMY  2000   • COLONOSCOPY  6/2/2010    • COLONOSCOPY N/A 2/13/2019    Performed by [email or web address]  Location: thoracic and lumbar back  Management Techniques: Breathing techniques;Relaxation;Repositioning    COGNITION  · Overall Cognitive Status:  WFL - within functional limits    BALANCE     Dynamic Sitting: Fair -  Static Standing: Fair -  Dynamic S #1 Patient is able to demonstrate supine - sit EOB @ level: modified independent     Goal #1   Current Status    Goal #2 Patient is able to demonstrate transfers EOB to/from Chair/Wheelchair at assistance level: modified independent with walker - rolling

## 2020-10-15 NOTE — PROGRESS NOTES
[facility] Hospitalist Team  Progress Note    Gamaliel Vásquez Patient Status:  Inpatient    1960 MRN [id number]   Location [facility] 4W/SW/SE Attending Bill Osei MD   Hosp Day # 1 PCP Nicole Toledo DO     CC: Follow Up  PCP: Nicole Toledo to transition to oral pills. Denies pain in her legs or N/T. Vomited x 2 yesterday, had some toast and crackers this am without emesis but did have some nausea. Wants some ensure. Plans for loja removal at 12.  Drain output noted       OBJECTIVE:   Blood p Oral, Daily PRN    •  bisacodyl (DULCOLAX) rectal suppository 10 mg, 10 mg, Rectal, Daily PRN    •  Fleet Enema (FLEET) 7-19 GM/118ML enema 133 mL, 1 enema, Rectal, Once PRN    •  ondansetron HCl (ZOFRAN) injection 4 mg, 4 mg, Intravenous, Q4H PRN    •  Pr surgical note for specific details.     Dictated by (CST): Nohemi Sen MD on 10/14/2020 at 12:33 PM     Finalized by (CST): Nohemi Sen MD on 10/14/2020 at 12:35 PM              SEE ATTENDING NOTE BELOW:   Patient seen and examined independently

## 2020-10-15 NOTE — PLAN OF CARE
Problem: Patient Centered Care  Goal: Patient preferences are identified and integrated in the patient's plan of care  Description: Interventions:  - What would you like us to know as we care for you?   - Provide timely, complete, and accurate informatio for assistance with activity based on assessment  - Modify environment to reduce risk of injury  - Provide assistive devices as appropriate  - Consider OT/PT consult to assist with strengthening/mobility  - Encourage toileting schedule  Outcome: Progressin

## 2020-10-16 PROCEDURE — 86901 BLOOD TYPING SEROLOGIC RH(D): CPT | Performed by: NURSE PRACTITIONER

## 2020-10-16 PROCEDURE — 0RGA071 FUSION OF THORACOLUMBAR VERTEBRAL JOINT WITH AUTOLOGOUS TISSUE SUBSTITUTE, POSTERIOR APPROACH, POSTERIOR COLUMN, OPEN APPROACH: ICD-10-PCS | Performed by: ORTHOPAEDIC SURGERY

## 2020-10-16 PROCEDURE — 93010 ELECTROCARDIOGRAM REPORT: CPT | Performed by: NURSE PRACTITIONER

## 2020-10-16 PROCEDURE — 3E0T3BZ INTRODUCTION OF ANESTHETIC AGENT INTO PERIPHERAL NERVES AND PLEXI, PERCUTANEOUS APPROACH: ICD-10-PCS | Performed by: ORTHOPAEDIC SURGERY

## 2020-10-16 PROCEDURE — 82962 GLUCOSE BLOOD TEST: CPT

## 2020-10-16 PROCEDURE — 85014 HEMATOCRIT: CPT | Performed by: NURSE PRACTITIONER

## 2020-10-16 PROCEDURE — 85025 COMPLETE CBC W/AUTO DIFF WBC: CPT | Performed by: NURSE PRACTITIONER

## 2020-10-16 PROCEDURE — 0QP004Z REMOVAL OF INTERNAL FIXATION DEVICE FROM LUMBAR VERTEBRA, OPEN APPROACH: ICD-10-PCS | Performed by: ORTHOPAEDIC SURGERY

## 2020-10-16 PROCEDURE — 93005 ELECTROCARDIOGRAM TRACING: CPT

## 2020-10-16 PROCEDURE — 80048 BASIC METABOLIC PNL TOTAL CA: CPT | Performed by: NURSE PRACTITIONER

## 2020-10-16 PROCEDURE — 0QH204Z INSERTION OF INTERNAL FIXATION DEVICE INTO RIGHT PELVIC BONE, OPEN APPROACH: ICD-10-PCS | Performed by: ORTHOPAEDIC SURGERY

## 2020-10-16 PROCEDURE — 86900 BLOOD TYPING SEROLOGIC ABO: CPT | Performed by: NURSE PRACTITIONER

## 2020-10-16 PROCEDURE — 85018 HEMOGLOBIN: CPT | Performed by: NURSE PRACTITIONER

## 2020-10-16 PROCEDURE — 0PS404Z REPOSITION THORACIC VERTEBRA WITH INTERNAL FIXATION DEVICE, OPEN APPROACH: ICD-10-PCS | Performed by: ORTHOPAEDIC SURGERY

## 2020-10-16 PROCEDURE — 4A11X4G MONITORING OF PERIPHERAL NERVOUS ELECTRICAL ACTIVITY, INTRAOPERATIVE, EXTERNAL APPROACH: ICD-10-PCS | Performed by: ORTHOPAEDIC SURGERY

## 2020-10-16 PROCEDURE — 85018 HEMOGLOBIN: CPT | Performed by: HOSPITALIST

## 2020-10-16 PROCEDURE — 0QS004Z REPOSITION LUMBAR VERTEBRA WITH INTERNAL FIXATION DEVICE, OPEN APPROACH: ICD-10-PCS | Performed by: ORTHOPAEDIC SURGERY

## 2020-10-16 PROCEDURE — 86850 RBC ANTIBODY SCREEN: CPT | Performed by: NURSE PRACTITIONER

## 2020-10-16 PROCEDURE — 0RG8071 FUSION OF 8 OR MORE THORACIC VERTEBRAL JOINTS WITH AUTOLOGOUS TISSUE SUBSTITUTE, POSTERIOR APPROACH, POSTERIOR COLUMN, OPEN APPROACH: ICD-10-PCS | Performed by: ORTHOPAEDIC SURGERY

## 2020-10-16 PROCEDURE — 85014 HEMATOCRIT: CPT | Performed by: HOSPITALIST

## 2020-10-16 RX ORDER — HYDROMORPHONE HYDROCHLORIDE 1 MG/ML
0.2 INJECTION, SOLUTION INTRAMUSCULAR; INTRAVENOUS; SUBCUTANEOUS EVERY 4 HOURS PRN
Status: DISCONTINUED | OUTPATIENT
Start: 2020-10-16 | End: 2020-10-16

## 2020-10-16 RX ORDER — OXYCODONE AND ACETAMINOPHEN 10; 325 MG/1; MG/1
TABLET ORAL EVERY 4 HOURS PRN
Qty: 40 TABLET | Refills: 0 | Status: SHIPPED | OUTPATIENT
Start: 2020-10-16 | End: 2020-10-20

## 2020-10-16 RX ORDER — POLYETHYLENE GLYCOL 3350 17 G/17G
17 POWDER, FOR SOLUTION ORAL DAILY PRN
Qty: 30 EACH | Refills: 0 | Status: SHIPPED | OUTPATIENT
Start: 2020-10-16

## 2020-10-16 RX ORDER — SODIUM CHLORIDE 9 MG/ML
INJECTION, SOLUTION INTRAVENOUS CONTINUOUS
Status: DISCONTINUED | OUTPATIENT
Start: 2020-10-16 | End: 2020-10-17

## 2020-10-16 RX ORDER — CALCIUM CARBONATE 200(500)MG
500 TABLET,CHEWABLE ORAL 2 TIMES DAILY WITH MEALS
Qty: 60 TABLET | Refills: 0 | Status: SHIPPED | OUTPATIENT
Start: 2020-10-16

## 2020-10-16 RX ORDER — TIZANIDINE 2 MG/1
TABLET ORAL 3 TIMES DAILY PRN
Qty: 40 TABLET | Refills: 1 | Status: SHIPPED | OUTPATIENT
Start: 2020-10-16 | End: 2020-10-29

## 2020-10-16 NOTE — PLAN OF CARE
Kraig Mesa is pod 1 spinal sx with dr bobby/sofi. Cms wnl. Nausea this am-pca d/c'ed -po pain meds & ice applcation. Hemovac drain to gravity per pa this am. Sx drsemma c/d/I. Spinal/back prec followed. Cote cath dc'ed-voiding. Afebrile, no s/s infection.  Home SAFETY ADULT - FALL  Goal: Free from fall injury  Description: INTERVENTIONS:  - Assess pt frequently for physical needs  - Identify cognitive and physical deficits and behaviors that affect risk of falls.   - [place] fall precautions as indicated by asse

## 2020-10-16 NOTE — PROGRESS NOTES
RRT called on this patient per NIK MONDRAGON [facility] RN. This Rn then took over care after RRT was completed. Patient escorted to room 222. Report given at bedside to receiving RN Isaias Valerio.  called-but limited voicemail was left d/t hippa.  Receiving Rn has documented that

## 2020-10-16 NOTE — PROGRESS NOTES
POD 2  Awake, alert, only complaint is incisional pain. Getting Percocet for pain. Had hypotensive episode, received fluid bolus, BP restored.  at bedside. Appetite poor. Has only walked to BR since surgery.   SED's and MARTIN's were on when on gen

## 2020-10-16 NOTE — PLAN OF CARE
Problem: Patient Centered Care  Goal: Patient preferences are identified and integrated in the patient's plan of care  Description: Interventions:  - What would you like us to know as we care for you?  Have had multiple back surgeries  - Provide timely, c limitations  - Instruct pt to call for assistance with activity based on assessment  - Modify environment to reduce risk of injury  - Provide assistive devices as appropriate  - Consider OT/PT consult to assist with strengthening/mobility  - Encourage toil obtain 12 lead EKG if indicated  - Evaluate effectiveness of antiarrhythmic and heart rate control medications as ordered  - Initiate emergency measures for life threatening arrhythmias  - Monitor electrolytes and administer replacement therapy as ordered Progressing  Goal: Maintains adequate nutritional intake (undernourished)  Description: INTERVENTIONS:  - Monitor percentage of each meal consumed  - Identify factors contributing to decreased intake, treat as appropriate  - Assist with meals as needed  - ordered  - Monitor response to electrolyte replacements, including rhythm and repeat lab results as appropriate  - Fluid restriction as ordered  - Instruct patient on fluid and nutrition restrictions as appropriate  Outcome: Progressing  Goal: Hemodynamic stability  Description: INTERVENTIONS  - Assess for signs and symptoms of bleeding or hemorrhage  - Monitor labs and vital signs for trends  - Administer supportive blood products/factors, fluids and medications as ordered and appropriate  - Administer sup Assess for and report changes in neurological status  - Initiate measures to prevent increased intracranial pressure  - Maintain blood pressure and fluid volume within ordered parameters to optimize cerebral perfusion and minimize risk of hemorrhage  - Mon highest/safest level of independence in self care  Description: Interventions:  - Assess ability and encourage patient to participate in ADLs to maximize function  - Promote sitting position while performing ADLs such as feeding, grooming, and bathing  - E

## 2020-10-16 NOTE — PROGRESS NOTES
Patient arrived to unit with nurse from other floor. Patient is alert and awake. Received report on unit with patient at bedside.        10/16/20 1000   Vitals   Pulse 100   Heart Rate Source Monitor   Resp 24   /42   MAP (mmHg) 62   BP Location Lef

## 2020-10-16 NOTE — PAYOR COMM NOTE
--------------  CONTINUED STAY REVIEW    Payor: [employer] [facility]  Subscriber #:  [id number]  Authorization Number: [id number]    Admit date: 10/14/20  Admit time: 8526    Admitting Physician: Danielle Donnelly MD  Attending Physician:  Danielle Donnelly MD  Primary DAY:  Calcium Carbonate Antacid (TUMS) chewable tab 500 mg     Date Action Dose Route User    10/15/2020 1808 Given 500 mg Oral Soila Cerna RN      diazepam (VALIUM) tab 5 mg     Date Action Dose Route User    10/15/2020 2102 Given 5 mg Oral Josseline Men

## 2020-10-16 NOTE — ED PROVIDER NOTES
Nocturnist rapid response note    Rapid response called as the patient has been persistently hypotensive despite a 500 mL bolus of saline earlier.   She is now postop day 2 from extensive spinal surgery with about 800 blood loss with around 300 given back t

## 2020-10-17 PROCEDURE — 85025 COMPLETE CBC W/AUTO DIFF WBC: CPT | Performed by: NURSE PRACTITIONER

## 2020-10-17 PROCEDURE — C9113 INJ PANTOPRAZOLE SODIUM, VIA: HCPCS | Performed by: NURSE PRACTITIONER

## 2020-10-17 PROCEDURE — 80048 BASIC METABOLIC PNL TOTAL CA: CPT | Performed by: NURSE PRACTITIONER

## 2020-10-17 RX ORDER — HYDROCODONE BITARTRATE AND ACETAMINOPHEN 10; 325 MG/1; MG/1
1 TABLET ORAL EVERY 4 HOURS PRN
Status: DISCONTINUED | OUTPATIENT
Start: 2020-10-17 | End: 2020-10-18

## 2020-10-17 RX ORDER — PANTOPRAZOLE SODIUM 40 MG/1
40 TABLET, DELAYED RELEASE ORAL
Status: DISCONTINUED | OUTPATIENT
Start: 2020-10-18 | End: 2020-10-20

## 2020-10-17 NOTE — PLAN OF CARE
Problem: Patient Centered Care  Goal: Patient preferences are identified and integrated in the patient's plan of care  Description: Interventions:  - What would you like us to know as we care for you?   - Provide timely, complete, and accurate informatio for assistance with activity based on assessment  - Modify environment to reduce risk of injury  - Provide assistive devices as appropriate  - Consider OT/PT consult to assist with strengthening/mobility  - Encourage toileting schedule  Outcome: Progressin complex needs related to functional status, cognitive ability or social support system  Outcome: Progressing     Problem: CARDIOVASCULAR - ADULT  Goal: Maintains optimal cardiac output and hemodynamic stability  Description: INTERVENTIONS:  - Monitor vital absence of nausea and vomiting  Description: INTERVENTIONS:  - Maintain adequate hydration with IV or PO as ordered and tolerated  - Nasogastric tube to low intermittent suction as ordered  - Evaluate effectiveness of ordered antiemetic medications  - Prov signs and symptoms of hyperglycemia and hypoglycemia  - Administer ordered medications to maintain glucose within target range  - Assess barriers to adequate nutritional intake and initiate nutrition consult as needed  - Instruct patient on self management

## 2020-10-17 NOTE — PROGRESS NOTES
Handoff report given to receiving RN at bedside, all questions answered. Patient resting comfortably in bed, no acute distress noted. Patient sheets clean, dry. Call light within reach, three side rails up, bed is locked in lowest position.  Lines/Drai

## 2020-10-17 NOTE — OCCUPATIONAL THERAPY NOTE
Pt transferred to CCU yesterdaywith low BP. Orders received for OT re-evaluation. Chart reviewed. Spoke with MARINA Bond. Pt in increased output, only has orders to dangle at this time.  RN to call and clarify activity orders with surgeon and will notify thera

## 2020-10-17 NOTE — PROGRESS NOTES
Double RN skin check done prior to transfer off Unit. Skin check performed by this RN and maksim    Wounds are as follows: back dressing clean dry and intact, surgical dressing hemovac removed site is clean dry intact.     Will remain available for any furth

## 2020-10-17 NOTE — PROGRESS NOTES
[facility] Hospitalist Team  Progress Note    Bhavani Donohue Patient Status:  Inpatient    1960 MRN [id number]   Location [facility] 4W/SW/SE Attending Lucy Denny MD   Hosp Day # 3 PCP Ce King DO     CC: Follow Up  PCP: Ce King nausea, no chest pain or sob      OBJECTIVE:   Blood pressure 155/70, pulse 109, temperature 97 °F (36.1 °C), temperature source Oral, resp. rate 21, height 5' 7\" (1.702 m), weight 256 lb 9.9 oz (116.4 kg), last menstrual period 04/07/1997, SpO2 93 %. 4 mg, 4 mg, Oral, TID PRN    •  diazepam (VALIUM) tab 5 mg, 5 mg, Oral, Q6H PRN    •  diphenhydrAMINE (BENADRYL) cap/tab 25 mg, 25 mg, Oral, Q4H PRN    •  Calcium Carbonate Antacid (TUMS) chewable tab 500 mg, 500 mg, Oral, BID with meals    •  Naloxone HCl

## 2020-10-17 NOTE — PHYSICAL THERAPY NOTE
PT AM note: Pt was transferred to CCU 10/16 with hypotension, needs re eval. Pt remains hypotensive today, RN will clarify MD activity orders that are currently EOB dangle only when BP's improve. BP 73/50 hold PT treatment at this time.  PT to progress as estefania

## 2020-10-17 NOTE — PROGRESS NOTES
Spine Service Progress Note    Subjective: BP improved while in ICU. Incisional site pain. Walked to bathroom and tolerated well. Spouse at bedside. With GERD. 1 tums not improving.      Pain: controlled on current medications   denies F/C/N/V  Swallow: g to monitor reflux after TUMs dose. Discuss with nursing to page hospitalist if GERD remains uncontrolled with TUMs.   -Case reviewed with Dr. Genet Landa for plan of care. Ragena Primrose, APN Dr.Mather.    Division of Spine Surgery  [facility]

## 2020-10-17 NOTE — PROGRESS NOTES
[facility] Pharmacy Note: Route Optimization for Pantoprazole (PROTONIX)    Patient is currently on Pantoprazole (PROTONIX) 40 mg IV every 24 hours.    The patient meets the criteria to convert to the oral equivalent as established by the IV to Oral conversion pro

## 2020-10-17 NOTE — PLAN OF CARE
Percocet PRN with reported relief of pain. Zofran PRN for nausea d/t percocet.  Pt declined suppository to assist in relief of constipation- she is considering use in AM.  Problem: PAIN - ADULT  Goal: Verbalizes/displays adequate comfort level or patient's ordered  - Obtain nutritional consult as needed  - Evaluate fluid balance  Outcome: Progressing

## 2020-10-17 NOTE — PLAN OF CARE
[facility] HGB and BP stabilizing. WX1. Norco for pain. Nausea r/t pain meds, less since switched to norco. Still giving meds w/ food and zofran proph. Last O2 sat high 80's. Started 2L O2 and reinforced IS.   Plan is and transportation as appropriate  - Identify discharge learning needs (meds, wound care, etc)  - Arrange for interpreters to assist at discharge as needed  - Consider post-discharge preferences of patient/family/discharge partner  - Complete POLST form as retention  Outcome: Progressing

## 2020-10-18 PROCEDURE — 85025 COMPLETE CBC W/AUTO DIFF WBC: CPT | Performed by: NURSE PRACTITIONER

## 2020-10-18 PROCEDURE — 97535 SELF CARE MNGMENT TRAINING: CPT

## 2020-10-18 PROCEDURE — 80048 BASIC METABOLIC PNL TOTAL CA: CPT | Performed by: HOSPITALIST

## 2020-10-18 PROCEDURE — 97164 PT RE-EVAL EST PLAN CARE: CPT

## 2020-10-18 PROCEDURE — 83735 ASSAY OF MAGNESIUM: CPT | Performed by: HOSPITALIST

## 2020-10-18 PROCEDURE — 97116 GAIT TRAINING THERAPY: CPT

## 2020-10-18 PROCEDURE — 97168 OT RE-EVAL EST PLAN CARE: CPT

## 2020-10-18 RX ORDER — HYDROCODONE BITARTRATE AND ACETAMINOPHEN 10; 325 MG/1; MG/1
1 TABLET ORAL EVERY 6 HOURS PRN
Status: DISCONTINUED | OUTPATIENT
Start: 2020-10-18 | End: 2020-10-19

## 2020-10-18 RX ORDER — HYDROCODONE BITARTRATE AND ACETAMINOPHEN 10; 325 MG/1; MG/1
1 TABLET ORAL EVERY 6 HOURS PRN
Status: DISCONTINUED | OUTPATIENT
Start: 2020-10-18 | End: 2020-10-18

## 2020-10-18 RX ORDER — TRAMADOL HYDROCHLORIDE 50 MG/1
50 TABLET ORAL EVERY 6 HOURS PRN
Status: DISCONTINUED | OUTPATIENT
Start: 2020-10-18 | End: 2020-10-18

## 2020-10-18 RX ORDER — HYDROCODONE BITARTRATE AND ACETAMINOPHEN 5; 325 MG/1; MG/1
1 TABLET ORAL EVERY 4 HOURS PRN
Status: DISCONTINUED | OUTPATIENT
Start: 2020-10-18 | End: 2020-10-18

## 2020-10-18 RX ORDER — OXYCODONE HYDROCHLORIDE AND ACETAMINOPHEN 5; 325 MG/1; MG/1
1 TABLET ORAL EVERY 4 HOURS PRN
Status: DISCONTINUED | OUTPATIENT
Start: 2020-10-18 | End: 2020-10-18

## 2020-10-18 RX ORDER — HYDROCODONE BITARTRATE AND ACETAMINOPHEN 5; 325 MG/1; MG/1
1 TABLET ORAL EVERY 4 HOURS PRN
Status: DISCONTINUED | OUTPATIENT
Start: 2020-10-18 | End: 2020-10-19

## 2020-10-18 NOTE — PROGRESS NOTES
[facility] Hospitalist Team  Progress Note    Irmasixto Carvalhos Patient Status:  Inpatient    1960 MRN [id number]   Location [facility] 4W/SW/SE Attending Sade Liao MD   Hosp Day # 4 PCP Mila Agarwal DO     CC: Follow Up  PCP: Mila Agarwal pain meds, will not take percocet as severe nausea with this, also states norco 10 also causing nausea, wants to try norco 5 with reg zofran, and norco 10 for breakthrough if sever pain.   No chest pain, no vomiting      OBJECTIVE:   Blood pressure (!) 151/ MAGNESIA) 400 MG/5ML suspension 30 mL, 30 mL, Oral, Daily PRN    •  bisacodyl (DULCOLAX) rectal suppository 10 mg, 10 mg, Rectal, Daily PRN    •  Fleet Enema (FLEET) 7-19 GM/118ML enema 133 mL, 1 enema, Rectal, Once PRN    •  tiZANidine HCl (ZANAFLEX) tab

## 2020-10-18 NOTE — PROGRESS NOTES
Spine Service Progress Note    Subjective: Reports pain is uncontrolled. Nausea with dry heaving. Unsure on abdominal pain when questioned, then states some abdominal discomfort. Does not feel she will have a BM. Urinating well.  No muscle relaxant due to given recent history of spine surgery and risk for formation of epidural hematoma. -If no BM this morning to add Milk of Mag today to encourage BM, consider suppository    -Discharge plan: continue to monitor today due to uncontrolled pain.    -Questions e

## 2020-10-18 NOTE — PLAN OF CARE
POD 3-4.  1x RW. LBM 10/13, +gas. Patient received Miralax Sat afternoon. Monitoring SpO2.  94% RA @2000. Frequent repositioning with pillow wedging/support for back comfort. Current plan for home w/ CGA from  once cleared.     Update 0145: Selena request assistance  - Assess pain using appropriate pain scale  - Administer analgesics based on type and severity of pain and evaluate response  - Implement non-pharmacological measures as appropriate and evaluate response  - Consider cultural and social of patient/family/discharge partner  - Complete POLST form as appropriate  - Assess patient's ability to be responsible for managing their own health  - Refer to Case Management Department for coordinating discharge planning if the patient needs post-hospi suctioning and perform as needed  - Assess and instruct to report SOB or any respiratory difficulty  - Respiratory Therapy support as indicated  - Manage/alleviate anxiety  - Monitor for signs/symptoms of CO2 retention  Outcome: Progressing     Problem: GA contributing to over-consumption  Outcome: Progressing     Problem: GENITOURINARY - ADULT  Goal: Absence of urinary retention  Description: INTERVENTIONS:  - Assess patient’s ability to void and empty bladder  - Monitor intake/output and perform bladder sc restrictions as appropriate  Outcome: Progressing     Problem: SKIN/TISSUE INTEGRITY - ADULT  Goal: Skin integrity remains intact  Description: INTERVENTIONS  - Assess and document risk factors for pressure ulcer development  - Assess and document skin int Avoid use of toothpicks and dental floss  - Use electric shaver for shaving  - Use soft bristle tooth brush  - Limit straining and forceful nose blowing  Outcome: Progressing     Problem: MUSCULOSKELETAL - ADULT  Goal: Return mobility to safest level of fu duration and description of seizure to MD/LIP  - If seizure occurs, turn patient to side and suction secretions as needed  - Reorient patient post seizure  - Seizure pads on all 4 side rails  - Instruct patient/family to notify RN of any seizure activity MD (or speech pathologist) if coughing or persistent throat clearing or wet/gurgly vocal quality is noted  Outcome: Progressing

## 2020-10-18 NOTE — OCCUPATIONAL THERAPY NOTE
OCCUPATIONAL THERAPY EVALUATION - INPATIENT     Room Number: 421/421-A  Evaluation Date: 10/18/2020  Type of Evaluation: Re-evaluation  Presenting Problem: T3-L1 osteotomies, T10-pelvis fixation, L1-4 removal of deep implants; T3-4 instrumentation    Physi home with 24 hr assist pending medical progress. DISCHARGE RECOMMENDATIONS  OT Discharge Recommendations: 24 hour care/supervision(anticipate pending medical progress)       PLAN  OT Treatment Plan: Balance activities; Energy conservation/work simplifi LUMBAR FACET INJECTION OR MEDIAL BRANCH NERVE BLOCK Bilateral 10/19/2015    Performed by Charu Wilson MD at [address]   • LUMBAR FACET INJECTION OR MEDIAL BRANCH NERVE BLOCK Bilateral 9/11/2015    Performed by Charu Wilson MD Putting on and taking off regular upper body clothing?: A Little  -   Taking care of personal grooming such as brushing teeth?: A Little  -   Eating meals?: None    AM-PAC Score:  Score: 16  Approx Degree of Impairment: 53.32%  Standardized Score (AM-PAC S

## 2020-10-18 NOTE — PHYSICAL THERAPY NOTE
PHYSICAL THERAPY TREATMENT NOTE - INPATIENT     Room Number: 421/421-A       Presenting Problem: Spinal fusion T3-L1    Problem List  Active Problems:    Thoracic kyphosis      PHYSICAL THERAPY ASSESSMENT   PPE worn during this visit:  Gloves, mask, eye sh BEARING RESTRICTION  Weight Bearing Restriction: None                PAIN ASSESSMENT   Ratin(at rest;  8/10 during activity)  Location: incision site  Management Techniques: Activity promotion; Body mechanics; Relaxation;Repositioning    BALANCE goal is: decrease pain.     Goal #1 Patient is able to demonstrate supine - sit EOB @ level: modified independent      Goal #1   Current Status  min A   Goal #2 Patient is able to demonstrate transfers EOB to/from Chair/Wheelchair at assistance level: modif

## 2020-10-19 PROCEDURE — 80048 BASIC METABOLIC PNL TOTAL CA: CPT | Performed by: HOSPITALIST

## 2020-10-19 PROCEDURE — 97535 SELF CARE MNGMENT TRAINING: CPT

## 2020-10-19 PROCEDURE — 97530 THERAPEUTIC ACTIVITIES: CPT

## 2020-10-19 PROCEDURE — 85027 COMPLETE CBC AUTOMATED: CPT | Performed by: HOSPITALIST

## 2020-10-19 PROCEDURE — 81001 URINALYSIS AUTO W/SCOPE: CPT | Performed by: NURSE PRACTITIONER

## 2020-10-19 PROCEDURE — 85025 COMPLETE CBC W/AUTO DIFF WBC: CPT | Performed by: HOSPITALIST

## 2020-10-19 PROCEDURE — 97116 GAIT TRAINING THERAPY: CPT

## 2020-10-19 PROCEDURE — 83735 ASSAY OF MAGNESIUM: CPT | Performed by: HOSPITALIST

## 2020-10-19 PROCEDURE — 97110 THERAPEUTIC EXERCISES: CPT

## 2020-10-19 PROCEDURE — 85007 BL SMEAR W/DIFF WBC COUNT: CPT | Performed by: HOSPITALIST

## 2020-10-19 RX ORDER — ONDANSETRON 2 MG/ML
4 INJECTION INTRAMUSCULAR; INTRAVENOUS EVERY 4 HOURS PRN
Status: DISCONTINUED | OUTPATIENT
Start: 2020-10-19 | End: 2020-10-20

## 2020-10-19 RX ORDER — POLYETHYLENE GLYCOL 3350 17 G/17G
17 POWDER, FOR SOLUTION ORAL DAILY
Status: DISCONTINUED | OUTPATIENT
Start: 2020-10-19 | End: 2020-10-20

## 2020-10-19 RX ORDER — BISACODYL 10 MG
10 SUPPOSITORY, RECTAL RECTAL
Status: DISCONTINUED | OUTPATIENT
Start: 2020-10-19 | End: 2020-10-20

## 2020-10-19 RX ORDER — OXYCODONE AND ACETAMINOPHEN 10; 325 MG/1; MG/1
1 TABLET ORAL EVERY 4 HOURS PRN
Status: DISCONTINUED | OUTPATIENT
Start: 2020-10-19 | End: 2020-10-20

## 2020-10-19 RX ORDER — POTASSIUM CHLORIDE 20 MEQ/1
40 TABLET, EXTENDED RELEASE ORAL ONCE
Status: COMPLETED | OUTPATIENT
Start: 2020-10-19 | End: 2020-10-19

## 2020-10-19 RX ORDER — POTASSIUM CHLORIDE 1.5 G/1.77G
40 POWDER, FOR SOLUTION ORAL EVERY 4 HOURS
Status: DISCONTINUED | OUTPATIENT
Start: 2020-10-19 | End: 2020-10-19

## 2020-10-19 RX ORDER — SCOLOPAMINE TRANSDERMAL SYSTEM 1 MG/1
1 PATCH, EXTENDED RELEASE TRANSDERMAL
Status: DISCONTINUED | OUTPATIENT
Start: 2020-10-19 | End: 2020-10-20

## 2020-10-19 RX ORDER — MAGNESIUM CARB/ALUMINUM HYDROX 105-160MG
296 TABLET,CHEWABLE ORAL ONCE
Status: COMPLETED | OUTPATIENT
Start: 2020-10-19 | End: 2020-10-19

## 2020-10-19 NOTE — PLAN OF CARE
Problem: PAIN - ADULT  Goal: Verbalizes/displays adequate comfort level or patient's stated pain goal  Description: INTERVENTIONS:  - Encourage pt to monitor pain and request assistance  - Assess pain using appropriate pain scale  - Administer analgesics saturation or ABGs  - Provide Smoking Cessation handout, if applicable  - Encourage broncho-pulmonary hygiene including cough, deep breathe, Incentive Spirometry  - Assess the need for suctioning and perform as needed  - Assess and instruct to report SOB o devices  - Assist with transfers and ambulation using safe patient handling equipment as needed  - Ensure adequate protection for wounds/incisions during mobilization  - Obtain PT/OT consults as needed  - Advance activity as appropriate  - Communicate orde fluid balance  Outcome: Not Progressing     Problem: GASTROINTESTINAL - ADULT  Goal: Maintains or returns to baseline bowel function  Description: INTERVENTIONS:  - Assess bowel function  - Maintain adequate hydration with IV or PO as ordered and tolerated

## 2020-10-19 NOTE — PROGRESS NOTES
[facility] Hospitalist Team  Progress Note    Wen Plant Patient Status:  Inpatient    1960 MRN [id number]   Location [facility] 4W/SW/SE Attending Claudia Byers MD   Hosp Day # 5 PCP Niall Rehman DO     CC: Follow Up  PCP: Niall Rehman [phone]  10/19/2020    SUBJECTIVE:   Still  Having pain 6-7/10, thinks norco 10 not helping. Still with nausea despite stopping percocet. hasn't had BM since last tuesday. Urinating every 1 1/2 hours, feels pressure.  Encouraged pt to wear brace for co HYDROcodone-acetaminophen (NORCO)  MG per tab 1 tablet, 1 tablet, Oral, Q6H PRN    •  Pantoprazole Sodium (PROTONIX) EC tab 40 mg, 40 mg, Oral, QAM AC    •  acetaminophen (TYLENOL) tab 650 mg, 650 mg, Oral, Q4H PRN    •  Senna (SENOKOT) tab 17.2 mg, morphine   -Bowel reg, antiemetics  -PT/OT/SW-TLSO brace     Hypotension (now resolved) 2/2 Acute Blood Loss Anemia   -had RRT earlier in course  -appropriately rescusitated and monitored in ICU     HTN-now with low BP see above  -holding diovan with lower

## 2020-10-19 NOTE — CHRONIC PAIN
[facility]  Report of Consultation    Franceseverette Smith Patient Status:  Inpatient    1960 MRN [id number]   Location [facility] 4W/SW/SE Attending Alex Jordan MD   Hosp Day # 5 PCP Marin Riggs DO     Date of Admission: OR MEDIAL BRANCH NERVE BLOCK Bilateral 10/19/2015    Performed by Del Lama MD at [address] Bilateral 9/11/2015    Performed by Del Lama MD at [address] [facility] Oral, Q4H PRN  •  HYDROcodone-acetaminophen (NORCO)  MG per tab 1 tablet, 1 tablet, Oral, Q6H PRN  •  Pantoprazole Sodium (PROTONIX) EC tab 40 mg, 40 mg, Oral, QAM AC  •  acetaminophen (TYLENOL) tab 650 mg, 650 mg, Oral, Q4H PRN **OR** [DISCONTINUED] 358.0 10/19/2020    CREATSERUM 0.37 10/19/2020    BUN 11 10/19/2020     10/19/2020    K 3.6 10/19/2020     10/19/2020    CO2 33.0 10/19/2020     10/19/2020    CA 8.7 10/19/2020    MG 1.8 10/19/2020         Impression:  Patient Active Pro

## 2020-10-19 NOTE — OCCUPATIONAL THERAPY NOTE
OCCUPATIONAL THERAPY TREATMENT NOTE - INPATIENT    Room Number: 421/421-A         Presenting Problem: T3-L1 osteotomies, T10-pelvis fixation, L1-4 removal of deep implants; T3-4 instrumentation     Problem List  Active Problems:    Thoracic kyphosis      O Monica Jasso pt will progress towards IP OT goals and demonstrate the physical skills required to return home with assist from family. Pt reported only concern about home is nausea and pain management.  Pt reported good support from , adequate fair+  Static Standing: fair+  Dynamic Standing: fair    FUNCTIONAL ADL ASSESSMENT  Grooming: SPV for standing balance  Bathing: NT  Toileting:  Mod A for elgin care   Lower Body Dressing: not assessed this session --RN asking for pt to return to bed   [facility]

## 2020-10-19 NOTE — PHYSICAL THERAPY NOTE
PHYSICAL THERAPY TREATMENT NOTE - INPATIENT     Room Number: 421/421-A       Presenting Problem: Spinal fusion T3-L1    Problem List  Active Problems:    Thoracic kyphosis      PHYSICAL THERAPY ASSESSMENT     Pt seen daily. PTA wore mask and  Gloves. Spinal sitting on the side of the bed?: A Little   How much help from another person does the patient currently need. ..   -   Moving to and from a bed to a chair (including a wheelchair)?: A Little   -   Need to walk in hospital room?: [address],[place] [address] 3[address]5

## 2020-10-19 NOTE — PAYOR COMM NOTE
--------------  CONTINUED STAY REVIEW    Payor: [employer]  Subscriber #:  [id number]  Authorization Number: [id number]    Admit date: 10/14/20  Admit time: 0856    Admitting Physician: Roma Koch MD  Attending Physician:  Roma Koch MD  Primary

## 2020-10-19 NOTE — PAYOR COMM NOTE
--------------  CONTINUED STAY REVIEW    Payor: [employer] [facility]  Subscriber #:  [id number]  Authorization Number: [id number]    Admit date: 10/14/20  Admit time: 1631    Admitting Physician: Danielle Donnelly MD  Attending Physician:  Danielle Donnelly MD  Primary consult  -reactive leukocytosis   -prn antiemetics  -Bowel reg, antiemetics  -TLSO brace  -PT/OT/SW-HHC  -as per surgery      Constipation   -bowel regimen, dulcolax today, if no response enema      Hypotension 2/2 Acute Blood Loss Anemia-resolved      HTN - 100.0 fL 84.7    MCH   26.0 - 34.0 pg 28.0    MCHC   31.0 - 37.0 g/dL 33.0    RDW-SD   35.1 - 46.3 fL 41.4    RDW   11.0 - 15.0 % 13.4    PLT   150.0 - 450.0 10(3)uL 358.0    Neutrophil Absolute Prelim   1.50 - 7.70 x10 (3) uL 8. 33KYMU Shirlene Sanches RN      ondansetron HCl [facility]) injection 4 mg     Date Action Dose Route User    10/19/2020 1447 Given 4 mg Intravenous Oneal Pierre RN      oxyCODONE-acetaminophen (PERCOCET)  MG per tab 1 tablet     Date Action Dose Route Use

## 2020-10-19 NOTE — PLAN OF CARE
Problem: Patient Centered Care  Goal: Patient preferences are identified and integrated in the patient's plan of care  Description: Interventions:  - What would you like us to know as we care for you?   - Provide timely, complete, and accurate informatio for assistance with activity based on assessment  - Modify environment to reduce risk of injury  - Provide assistive devices as appropriate  - Consider OT/PT consult to assist with strengthening/mobility  - Encourage toileting schedule  Outcome: Progressin Evaluate effectiveness of antiarrhythmic and heart rate control medications as ordered  - Initiate emergency measures for life threatening arrhythmias  - Monitor electrolytes and administer replacement therapy as ordered  Outcome: Progressing     Problem: adequate nutritional intake (undernourished)  Description: INTERVENTIONS:  - Monitor percentage of each meal consumed  - Identify factors contributing to decreased intake, treat as appropriate  - Assist with meals as needed  - Monitor I&O, WT and lab value electrolyte replacements, including rhythm and repeat lab results as appropriate  - Fluid restriction as ordered  - Instruct patient on fluid and nutrition restrictions as appropriate  Outcome: Progressing  Goal: Hemodynamic stability and optimal renal fun INTERVENTIONS  - Assess for signs and symptoms of bleeding or hemorrhage  - Monitor labs and vital signs for trends  - Administer supportive blood products/factors, fluids and medications as ordered and appropriate  - Administer supportive blood products/f changes in neurological status  - Initiate measures to prevent increased intracranial pressure  - Maintain blood pressure and fluid volume within ordered parameters to optimize cerebral perfusion and minimize risk of hemorrhage  - Monitor temperature, gluc independence in self care  Description: Interventions:  - Assess ability and encourage patient to participate in ADLs to maximize function  - Promote sitting position while performing ADLs such as feeding, grooming, and bathing  - Educate and encourage pat

## 2020-10-19 NOTE — PLAN OF CARE
VSS. Alert and oriented x4. Standby assist x1 with walker. Dressing changed in AM - clean, dry. Progressive bowel regimen for constipation - highly encouraged pt to walk/sit up in chair & utilize incentive spirometry.  Pt encouraged to use back brace as muc physical limitations  - Instruct pt to call for assistance with activity based on assessment  - Modify environment to reduce risk of injury  - Provide assistive devices as appropriate  - Consider OT/PT consult to assist with strengthening/mobility  - Encou Evaluate fluid balance, assess for edema, trend weights  10/19/2020 1437 by Tejinder Shipman RN  Outcome: Progressing  10/19/2020 1433 by Tejinder Shipman RN  Outcome: Progressing  Goal: Absence of cardiac arrhythmias or at baseline  Description: INTERVENTIO Obtain nutritional consult as needed  - Evaluate fluid balance  10/19/2020 1437 by Mauricio Carl RN  Outcome: Progressing  10/19/2020 1433 by Mauricio Carl RN  Outcome: Progressing  Goal: Maintains or returns to baseline bowel function  Description: IN scan as needed  - Follow urinary retention protocol/standard of care  - Consider collaborating with pharmacy to review patient's medication profile  - Implement strategies to promote bladder emptying  10/19/2020 1437 by Tejinder Shipman RN  Outcome: Progres Instruct patient on fluid and nutrition restrictions as appropriate  10/19/2020 1437 by Rodrigo Gonzalez RN  Outcome: Progressing  10/19/2020 1433 by Rodrigo Gonzalez RN  Outcome: Progressing     Problem: SKIN/TISSUE INTEGRITY - ADULT  Goal: Skin integrity r Gi Villanueva RN  Outcome: Progressing  10/19/2020 1433 by Gi Villanueva RN  Outcome: Progressing  Goal: Free from bleeding injury  Description: (Example usage: patient with low platelets)  INTERVENTIONS:  - Avoid intramuscular injections, enemas and r stable or improved neurological status  Description: INTERVENTIONS  - Assess for and report changes in neurological status  - Initiate measures to prevent increased intracranial pressure  - Maintain blood pressure and fluid volume within ordered parameters Progressing  10/19/2020 1433 by Teto Ayala RN  Outcome: Progressing     Problem: Impaired Functional Mobility  Goal: Achieve highest/safest level of mobility/gait  Description: Interventions:  - Assess patient's functional ability and stability  - Pro Progressing     Problem: Impaired Communication  Goal: Patient will achieve maximal communication potential  Description: Interventions:    10/19/2020 1437 by Cleo Brand RN  Outcome: Progressing  10/19/2020 1433 by Cleo Brand, RN  Outcome: Progres

## 2020-10-20 VITALS
RESPIRATION RATE: 18 BRPM | OXYGEN SATURATION: 95 % | TEMPERATURE: 98 F | DIASTOLIC BLOOD PRESSURE: 71 MMHG | WEIGHT: 256.63 LBS | HEART RATE: 94 BPM | SYSTOLIC BLOOD PRESSURE: 133 MMHG | BODY MASS INDEX: 40.28 KG/M2 | HEIGHT: 67 IN

## 2020-10-20 PROCEDURE — 97110 THERAPEUTIC EXERCISES: CPT

## 2020-10-20 PROCEDURE — 97530 THERAPEUTIC ACTIVITIES: CPT

## 2020-10-20 PROCEDURE — 80048 BASIC METABOLIC PNL TOTAL CA: CPT | Performed by: NURSE PRACTITIONER

## 2020-10-20 PROCEDURE — 97116 GAIT TRAINING THERAPY: CPT

## 2020-10-20 PROCEDURE — 85027 COMPLETE CBC AUTOMATED: CPT | Performed by: NURSE PRACTITIONER

## 2020-10-20 PROCEDURE — 85025 COMPLETE CBC W/AUTO DIFF WBC: CPT | Performed by: NURSE PRACTITIONER

## 2020-10-20 PROCEDURE — 85007 BL SMEAR W/DIFF WBC COUNT: CPT | Performed by: NURSE PRACTITIONER

## 2020-10-20 RX ORDER — ONDANSETRON 4 MG/1
4 TABLET, ORALLY DISINTEGRATING ORAL EVERY 4 HOURS PRN
Qty: 30 TABLET | Refills: 0 | Status: SHIPPED | OUTPATIENT
Start: 2020-10-20 | End: 2021-12-02

## 2020-10-20 RX ORDER — OXYCODONE AND ACETAMINOPHEN 10; 325 MG/1; MG/1
1 TABLET ORAL EVERY 4 HOURS PRN
Qty: 40 TABLET | Refills: 0 | Status: SHIPPED | OUTPATIENT
Start: 2020-10-20 | End: 2020-10-29

## 2020-10-20 RX ORDER — POTASSIUM CHLORIDE 20 MEQ/1
40 TABLET, EXTENDED RELEASE ORAL EVERY 4 HOURS
Status: DISCONTINUED | OUTPATIENT
Start: 2020-10-20 | End: 2020-10-20

## 2020-10-20 RX ORDER — PSEUDOEPHEDRINE HCL 30 MG
100 TABLET ORAL 2 TIMES DAILY
Qty: 60 CAPSULE | Refills: 0 | Status: SHIPPED | OUTPATIENT
Start: 2020-10-20 | End: 2020-10-20

## 2020-10-20 NOTE — CM/SW NOTE
11: 17AM  JAROCHO received MDO for home health. JAROCHO met with pt to complete an initial assessment. SW confirmed pt's address and phone number with the pt. Pt lives in a 1 level  house with spouse. There is/are 1 steps into the home and 0 steps to the bedrooms.  Pt

## 2020-10-20 NOTE — HOME CARE LIAISON
Received referral from Jose J for [facility]. Unable to accept at this time due to staffing. Notified Jose J.

## 2020-10-20 NOTE — PHYSICAL THERAPY NOTE
PHYSICAL THERAPY TREATMENT NOTE - INPATIENT     Room Number: 421/421-A       Presenting Problem: Spinal fusion T3-L1    Problem List  Active Problems:    Thoracic kyphosis      PHYSICAL THERAPY ASSESSMENT     Pt seen daily. PTA wore mask and  gloves. Spinal arms (e.g., wheelchair, bedside commode, etc.): A Little   -   Moving from lying on back to sitting on the side of the bed?: A Little   How much help from another person does the patient currently need. ..   -   Moving to and from a bed to a chair (includin

## 2020-10-20 NOTE — CHRONIC PAIN
[facility]  Inpatient Pain Management Progress Note      Patient name: Verona Jean-Baptiste 63year old female  : 1960  MRN: [id number]    Diagnosis: Preop testing  (primary encounter diagnosis)  Other secondary kyphosis, thoracic trever [facility]   • HAND RESECTION ARTHROPLASTY OF CARPAL / METACARPAL Left 2/28/2017    Performed by Jax Saenz MD at [address] for fibroids and ovarian cysts   • LUMBAR FACET INJECTION OR MEDIAL BRANCH NERVE BLOCK Bi 17 g Oral Daily   • scopolamine  1 patch Transdermal Q72H   • Pantoprazole Sodium  40 mg Oral QAM AC   • Senna  17.2 mg Oral Nightly   • docusate sodium  100 mg Oral BID   • Calcium Carbonate Antacid  500 mg Oral BID with meals   • Vitamin C  1,000 mg Ora than 50% of the time was spent with counseling (nature of discussion centered around pain, therapy, and treatment options), face to face time, time spent reviewing data, obtaining patient information and discussing the care with the patients health care pr

## 2020-10-20 NOTE — DISCHARGE SUMMARY
[facility] Hospitalist Discharge Summary   Patient ID:  Tono Proctor  [id number]  89 year old  5/14/1960    Admit date: 10/14/2020  Discharge date: 10/20/2020    Primary Care Physician: Fannie Reveles DO   Attending Physician: Tino Arnold MD   Consults: fenofibrate      EXAM:   GENERAL: no apparent distress, overweight  NEURO: A/A Ox3  RESP: non labored, CTA  CARDIO: Regular, no murmur  ABD: soft, NT, ND, BS+  EXTREMITIES: no edema  BACK: dressing     Operative Procedures: Procedure(s) (LRB):  THORACIC SP mouth 2 (two) times daily.         STOP taking these medications    ALPRAZolam 0.25 MG Tabs  Commonly known as: XANAX     Duexis 800-26.6 MG Tabs  Generic drug: Ibuprofen-Famotidine     Etodolac 400 MG Tabs     HYDROcodone-acetaminophen 7.5-325 MG Tabs  Com stable, cooperative     Assessment and Plan     Removal hardware L1-4, T3-L1 osteotomies, PSF T3-L1, pelvic fixation, K2 instrumentation T3-pelvis  -pain service consulted while patient was post op.  She did better on percocet and going home with that for n

## 2020-10-20 NOTE — DISCHARGE PLANNING
Pt transported home via spouse. D/c instructions discussed with patient and spouse. Pt and spouse agreeable to plan of care/follow-up plan and had no further questions regarding d/c instruction. All pt belongings were transported with pt.  Pt educated on h

## 2020-10-20 NOTE — PLAN OF CARE
Problem: PAIN - ADULT  Goal: Verbalizes/displays adequate comfort level or patient's stated pain goal  Description: INTERVENTIONS:  - Encourage pt to monitor pain and request assistance  - Assess pain using appropriate pain scale  - Administer analgesics appropriate  - Identify discharge learning needs (meds, wound care, etc)  - Arrange for interpreters to assist at discharge as needed  - Consider post-discharge preferences of patient/family/discharge partner  - Complete POLST form as appropriate  - Assess or ABGs  - Provide Smoking Cessation handout, if applicable  - Encourage broncho-pulmonary hygiene including cough, deep breathe, Incentive Spirometry  - Assess the need for suctioning and perform as needed  - Assess and instruct to report SOB or any respi factors contributing to increased intake, treat as appropriate  - Monitor I&O, WT and lab values  - Obtain nutritional consult as needed  - Evaluate psychosocial factors contributing to over-consumption  Outcome: Progressing     Problem: GENITOURINARY - AD status, including labs, urine output, blood pressure (other measures as available)  - Encourage oral intake as appropriate  - Instruct patient on fluid and nutrition restrictions as appropriate  Outcome: Progressing     Problem: SKIN/TISSUE INTEGRITY - FILI achieve adequate hemostasis  - Assess for signs and symptoms of internal bleeding  - Monitor lab trends  - Patient is to report abnormal signs of bleeding to staff  - Avoid use of toothpicks and dental floss  - Use electric shaver for shaving  - Use soft b activity  Description: INTERVENTIONS:  - Maintain airway, patient safety  and administer oxygen as ordered  - Monitor patient for seizure activity, document and report duration and description of seizure to MD/LIP  - If seizure occurs, turn patient to side maintained, bed alarm activated. Call light within reach. Will continue to monitor.

## 2020-10-21 NOTE — OPERATIVE REPORT
[facility]    PATIENT'S NAME: Frieda VERNON   ATTENDING PHYSICIAN: Mirna Lindquist.  Courtney Pelletier MD   OPERATING PHYSICIAN: Cheryl Ham MD   PATIENT ACCOUNT#:   [id number]    LOCATION:  [address] [place] [address] #:   [id number]       DATE OF BIRTH: patient was brought into the operating room, laid supine. The patient was induced under general anesthesia. Endotracheal tube was placed. The patient was flipped prone onto A Bogdan frame. All bony prominences were carefully padded.   The back was ster create a wedge. A tricortical piece of bone was removed and this was saved for later bone grafting. A Steffee awl was then used to place pelvic screw.   Ball tip was used to ensure bony tunnel, and then placement of 8.5 x 90 mm screws were placed into the

## 2021-01-11 ENCOUNTER — ORDER TRANSCRIPTION (OUTPATIENT)
Dept: PHYSICAL THERAPY | Facility: HOSPITAL | Age: 61
End: 2021-01-11

## 2021-01-11 DIAGNOSIS — Z98.1 S/P SPINAL FUSION: Primary | ICD-10-CM

## 2021-01-13 ENCOUNTER — OFFICE VISIT (OUTPATIENT)
Dept: PHYSICAL THERAPY | Age: 61
End: 2021-01-13
Attending: ORTHOPAEDIC SURGERY
Payer: COMMERCIAL

## 2021-01-13 DIAGNOSIS — Z98.1 S/P SPINAL FUSION: ICD-10-CM

## 2021-01-13 PROCEDURE — 97110 THERAPEUTIC EXERCISES: CPT

## 2021-01-13 PROCEDURE — 97161 PT EVAL LOW COMPLEX 20 MIN: CPT

## 2021-01-13 NOTE — PROGRESS NOTES
SPINE EVALUATION:   Referring Physician: Dr. Adrianna Barrientos  Diagnosis: s/p T3 to the pelvis fusion done on 10/14/2020     Date of Service: 1/13/2021     PATIENT SUMMARY   Kailey Morgan is a 63year old female who presents to therapy today s/p T3 to the [facility] is exhausting. Using the cane on curbs, but able to do the steps into the house with  SBA. He always comes with when she goes for a walk, just for added safety.  Still having some difficulty with her L shoulder since she had to lay on L side for so l sensitivity/tingling. Seated posture without TLSO - increased CT kyphosis and mild rounded shoulder    Thoracolumbar AROM: (* denotes performed with pain)  Flexion: 75  Extension: 10  Sidebending: R 25%; L 25%  Rotation: R 75%;  L 50%    Accessory motion: M improve scar mobility and deny tenderness in mid thoracic spine to tolerate standing >45 minutes for cooking more complex meals  · Pt will demonstrate improved core strength to be able to perform squats to  laundry basket and load dryer with <4/10 p

## 2021-01-19 ENCOUNTER — TELEPHONE (OUTPATIENT)
Dept: PHYSICAL THERAPY | Facility: HOSPITAL | Age: 61
End: 2021-01-19

## 2021-01-20 ENCOUNTER — APPOINTMENT (OUTPATIENT)
Dept: PHYSICAL THERAPY | Age: 61
End: 2021-01-20
Attending: ORTHOPAEDIC SURGERY
Payer: COMMERCIAL

## 2021-01-26 ENCOUNTER — OFFICE VISIT (OUTPATIENT)
Dept: PHYSICAL THERAPY | Age: 61
End: 2021-01-26
Attending: ORTHOPAEDIC SURGERY
Payer: COMMERCIAL

## 2021-01-26 DIAGNOSIS — Z98.1 S/P SPINAL FUSION: ICD-10-CM

## 2021-01-26 PROCEDURE — 97140 MANUAL THERAPY 1/> REGIONS: CPT

## 2021-01-26 PROCEDURE — 97110 THERAPEUTIC EXERCISES: CPT

## 2021-01-26 NOTE — PROGRESS NOTES
Dx:  s/p T3 to the pelvis fusion done on 10/14/2020            Insurance (Authorized # of Visits):  (30 visits/year)           Authorizing Physician: Dr. Kaitlin Hermosillo MD visit: March 2021  Fall Risk: standard         Precautions: Post Operative: continue t posturing and decreased pain with walking 2 blocks or driving 30 min without TLSO    · Pt will be independent and compliant with comprehensive HEP to maintain progress achieved in PT -progress       Plan: add pec stretch over foam beam and corner stretch;

## 2021-01-28 ENCOUNTER — APPOINTMENT (OUTPATIENT)
Dept: PHYSICAL THERAPY | Age: 61
End: 2021-01-28
Attending: ORTHOPAEDIC SURGERY
Payer: COMMERCIAL

## 2021-02-02 ENCOUNTER — OFFICE VISIT (OUTPATIENT)
Dept: PHYSICAL THERAPY | Age: 61
End: 2021-02-02
Attending: ORTHOPAEDIC SURGERY
Payer: COMMERCIAL

## 2021-02-02 DIAGNOSIS — Z98.1 S/P SPINAL FUSION: ICD-10-CM

## 2021-02-02 PROCEDURE — 97112 NEUROMUSCULAR REEDUCATION: CPT

## 2021-02-02 PROCEDURE — 97110 THERAPEUTIC EXERCISES: CPT

## 2021-02-02 PROCEDURE — 97140 MANUAL THERAPY 1/> REGIONS: CPT

## 2021-02-02 NOTE — PROGRESS NOTES
Dx:  s/p T3 to the pelvis fusion done on 10/14/2020            Insurance (Authorized # of Visits):  (30 visits/year)           Authorizing Physician: Dr. King Dose  Next MD visit: March 2021  Fall Risk: standard         Precautions: Post Operative: continue t Date: 2/2/2021                TX#: 3/10 Date:                 TX#: 4/ Date:                 TX#: 5/ Date:    Tx#: 6/   Therex  Nustep L3 x5 min  Prone rows 3s hold x15  -\"I\" extensions 3s x10   -\"T\" mid trap 3s x10   YTB horizontal ABD x15   YTB rows x1

## 2021-02-03 ENCOUNTER — OFFICE VISIT (OUTPATIENT)
Dept: PHYSICAL THERAPY | Age: 61
End: 2021-02-03
Attending: ORTHOPAEDIC SURGERY
Payer: COMMERCIAL

## 2021-02-03 PROCEDURE — 97110 THERAPEUTIC EXERCISES: CPT

## 2021-02-03 PROCEDURE — 97112 NEUROMUSCULAR REEDUCATION: CPT

## 2021-02-03 PROCEDURE — 97140 MANUAL THERAPY 1/> REGIONS: CPT

## 2021-02-03 NOTE — PROGRESS NOTES
Dx:  s/p T3 to the pelvis fusion done on 10/14/2020            Insurance (Authorized # of Visits):  (30 visits/year)           Authorizing Physician: Dr. Martine Baltazar  Next MD visit: March 2021  Fall Risk: standard         Precautions: Post Operative: continue t blocks or driving 30 min without TLSO -progress    · Pt will be independent and compliant with comprehensive HEP to maintain progress achieved in PT -progress       Plan: resume prone press ups for upper thoracic mobility; review standing tolerance and att x10, YTB extension x10, cervical retraction x10    Charges: therex x1, neuro re-ed x2, manual x1       Total Timed Treatment: 48 min  Total Treatment Time: 48 min

## 2021-02-04 ENCOUNTER — APPOINTMENT (OUTPATIENT)
Dept: PHYSICAL THERAPY | Age: 61
End: 2021-02-04
Attending: ORTHOPAEDIC SURGERY
Payer: COMMERCIAL

## 2021-02-08 ENCOUNTER — TELEPHONE (OUTPATIENT)
Dept: PHYSICAL THERAPY | Facility: HOSPITAL | Age: 61
End: 2021-02-08

## 2021-02-08 ENCOUNTER — APPOINTMENT (OUTPATIENT)
Dept: PHYSICAL THERAPY | Age: 61
End: 2021-02-08
Attending: ORTHOPAEDIC SURGERY
Payer: COMMERCIAL

## 2021-02-16 ENCOUNTER — APPOINTMENT (OUTPATIENT)
Dept: PHYSICAL THERAPY | Age: 61
End: 2021-02-16
Attending: ORTHOPAEDIC SURGERY
Payer: COMMERCIAL

## 2021-02-16 ENCOUNTER — TELEPHONE (OUTPATIENT)
Dept: PHYSICAL THERAPY | Facility: HOSPITAL | Age: 61
End: 2021-02-16

## 2021-02-18 ENCOUNTER — OFFICE VISIT (OUTPATIENT)
Dept: PHYSICAL THERAPY | Age: 61
End: 2021-02-18
Attending: ORTHOPAEDIC SURGERY
Payer: COMMERCIAL

## 2021-02-18 DIAGNOSIS — Z98.1 S/P SPINAL FUSION: ICD-10-CM

## 2021-02-18 PROCEDURE — 97110 THERAPEUTIC EXERCISES: CPT

## 2021-02-18 PROCEDURE — 97112 NEUROMUSCULAR REEDUCATION: CPT

## 2021-02-18 PROCEDURE — 97140 MANUAL THERAPY 1/> REGIONS: CPT

## 2021-02-18 NOTE — PROGRESS NOTES
Dx:  s/p T3 to the pelvis fusion done on 10/14/2020            Insurance (Authorized # of Visits):  (30 visits/year)           Authorizing Physician: Dr. Mary Guajardo  Next MD visit: March 11, 2021  Fall Risk: standard         Precautions: Post Operative: contin · Pt will be independent and compliant with comprehensive HEP to maintain progress achieved in PT -progress        Plan: progress resistance with lift training as tolerate  Date: 1/26/2021  TX#: 2/10 Date: 2/2/2021                TX#: 3/10 Date: 2/3/202 and scar STM x15 min  Prone PA Gr II  T1-L5 2x15s ea  Manual Therapy  Prone thoracolumbar paraspinal and scar STM x10 min  Prone PA Gr II  T1-L5 2x15s ea  Manual Therapy  seated upper thoracic anterior mobs Gr III x3 min  Prone thoracolumbar paraspinal and

## 2021-02-22 ENCOUNTER — TELEPHONE (OUTPATIENT)
Dept: PHYSICAL THERAPY | Facility: HOSPITAL | Age: 61
End: 2021-02-22

## 2021-02-23 ENCOUNTER — APPOINTMENT (OUTPATIENT)
Dept: PHYSICAL THERAPY | Age: 61
End: 2021-02-23
Attending: ORTHOPAEDIC SURGERY
Payer: COMMERCIAL

## 2021-02-25 ENCOUNTER — OFFICE VISIT (OUTPATIENT)
Dept: PHYSICAL THERAPY | Age: 61
End: 2021-02-25
Attending: ORTHOPAEDIC SURGERY
Payer: COMMERCIAL

## 2021-02-25 DIAGNOSIS — Z98.1 S/P SPINAL FUSION: ICD-10-CM

## 2021-02-25 PROCEDURE — 97140 MANUAL THERAPY 1/> REGIONS: CPT

## 2021-02-25 PROCEDURE — 97110 THERAPEUTIC EXERCISES: CPT

## 2021-02-25 PROCEDURE — 97112 NEUROMUSCULAR REEDUCATION: CPT

## 2021-02-25 NOTE — PROGRESS NOTES
Dx:  s/p T3 to the pelvis fusion done on 10/14/2020            Insurance (Authorized # of Visits):  (30 visits/year)           Authorizing Physician: Dr. Jessica Melgoza  Next MD visit: March 11, 2021  Fall Risk: standard         Precautions: Post Operative: contin minutes for cooking more complex meals -progress  · Pt will demonstrate improved core strength to be able to perform squats to  laundry basket and load dryer with <4/10 pain -able to complete 10lb crate lift without pain  · Pt will improve postural raise x10  -march x20 (SBA)  -alt UE/LE x15 (fair) Neuro Re-ed  Seated on SB  -YTB rows x15  -YTB extension x15 -cervical retraction x15  -march x20 (SBA)  -alt UE/LE x15 (fair)  Posture stance at wall with 1/2 foam roll:  -YONATHAN Scaption x12  -YONATHAN robbery x

## 2021-03-01 ENCOUNTER — OFFICE VISIT (OUTPATIENT)
Dept: PHYSICAL THERAPY | Age: 61
End: 2021-03-01
Attending: ORTHOPAEDIC SURGERY
Payer: COMMERCIAL

## 2021-03-01 DIAGNOSIS — Z98.1 S/P SPINAL FUSION: ICD-10-CM

## 2021-03-01 PROCEDURE — 97140 MANUAL THERAPY 1/> REGIONS: CPT

## 2021-03-01 PROCEDURE — 97110 THERAPEUTIC EXERCISES: CPT

## 2021-03-01 PROCEDURE — 97112 NEUROMUSCULAR REEDUCATION: CPT

## 2021-03-01 NOTE — PROGRESS NOTES
Dx:  s/p T3 to the pelvis fusion done on 10/14/2020            Insurance (Authorized # of Visits):  (30 visits/year)           Authorizing Physician: Dr. Paolo Hermosillo MD visit: March 11, 2021  Fall Risk: standard         Precautions: Post Operative: contin pain with walking 2 blocks or driving 30 min without TLSO -progress    · Pt will be independent and compliant with comprehensive HEP to maintain progress achieved in PT -progress        Plan: cable cross or lex \"Y\", \"T\", \"I\"  Date: 1/26/2021  T (SBA)  -alt UE/LE x15 (fair)  Posture stance at wall with 1/2 foam roll:  -YONATHNA Scaption x12  -YONATHAN robbery x12 Neuro Re-ed  Seated on SB   - march x20 (SBA)   - alt UE/LE x15 (fair)   - ball toss x15 (good)      - with leg out x15 ea (fair)      - with SLR

## 2021-03-03 ENCOUNTER — OFFICE VISIT (OUTPATIENT)
Dept: PHYSICAL THERAPY | Age: 61
End: 2021-03-03
Attending: ORTHOPAEDIC SURGERY
Payer: COMMERCIAL

## 2021-03-03 PROCEDURE — 97140 MANUAL THERAPY 1/> REGIONS: CPT

## 2021-03-03 PROCEDURE — 97112 NEUROMUSCULAR REEDUCATION: CPT

## 2021-03-03 PROCEDURE — 97110 THERAPEUTIC EXERCISES: CPT

## 2021-03-03 NOTE — PROGRESS NOTES
Dx:  s/p T3 to the pelvis fusion done on 10/14/2020            Insurance (Authorized # of Visits):  (30 visits/year)           Authorizing Physician: Dr. Shiloh Temple  Next MD visit: March 11, 2021  Fall Risk: standard         Precautions: Post Operative: contin TLSO -progress    · Pt will be independent and compliant with comprehensive HEP to maintain progress achieved in PT -progress        Plan: Attempt cervical traction.   Date: 1/26/2021  TX#: 2/10 Date: 2/2/2021                TX#: 3/10 Date: 2/3/2021 hold  Supine on airex beam for passive pec stretch 5 min   Neuro Re-ed  hooklying TA with march x12  -dead bug x12  Neuro Re-ed  Seated on SB TA with alt UE raise x10  -march x20 (SBA)  -alt UE/LE x15 (fair) Neuro Re-ed  Seated on SB  -YTB rows x15  -YTB e STM and stretch 5 min  Supine STM cervical paraspinals, C1-5 PA glides with rotation grade III-IV 5 min   HEP: YTB horizontal ABD x10, YTB rows x10, YTB extension x10, cervical retraction x10, UE diagonals YTB, overhead horizontal abd YTB, supine on rolled

## 2021-03-09 ENCOUNTER — OFFICE VISIT (OUTPATIENT)
Dept: PHYSICAL THERAPY | Age: 61
End: 2021-03-09
Attending: ORTHOPAEDIC SURGERY
Payer: COMMERCIAL

## 2021-03-09 PROCEDURE — 97112 NEUROMUSCULAR REEDUCATION: CPT

## 2021-03-09 PROCEDURE — 97140 MANUAL THERAPY 1/> REGIONS: CPT

## 2021-03-09 PROCEDURE — 97110 THERAPEUTIC EXERCISES: CPT

## 2021-03-09 NOTE — PROGRESS NOTES
Dx:  s/p T3 to the pelvis fusion done on 10/14/2020            Insurance (Authorized # of Visits):  (30 visits/year)           Authorizing Physician: Dr. Mary Guajardo  Next MD visit: March 11, 2021  Fall Risk: standard         Precautions: Post Operative: contin posturing and decreased pain with walking 2 blocks or driving 30 min without TLSO -progress    · Pt will be independent and compliant with comprehensive HEP to maintain progress achieved in PT -progress        Plan: Depending on pt and PCP attempt cervical follow finger with eyes 2x10 ea   - follow finger with head 2x10 ea  SLS   - LE swing 2x10 ea (UE assist)   - UE/LE swing 10 ea (UE assist)    Neuro Re-ed  SLS star reach (ant, lat, post) 5x ea side  2 laps in ham (200 ft) with obstacles (hurdles, airex b

## 2021-03-16 ENCOUNTER — APPOINTMENT (OUTPATIENT)
Dept: PHYSICAL THERAPY | Age: 61
End: 2021-03-16
Attending: ORTHOPAEDIC SURGERY
Payer: COMMERCIAL

## 2021-03-16 ENCOUNTER — OFFICE VISIT (OUTPATIENT)
Dept: PHYSICAL THERAPY | Age: 61
End: 2021-03-16
Attending: ORTHOPAEDIC SURGERY
Payer: COMMERCIAL

## 2021-03-16 PROCEDURE — 97012 MECHANICAL TRACTION THERAPY: CPT

## 2021-03-16 PROCEDURE — 97110 THERAPEUTIC EXERCISES: CPT

## 2021-03-16 NOTE — PROGRESS NOTES
Dx:  s/p T3 to the pelvis fusion done on 10/14/2020            Insurance (Authorized # of Visits):  (30 visits/year)           Authorizing Physician: Dr. Laure Sandra  Next MD visit: March 11, 2021  Fall Risk: standard         Precautions: none             Emma Garcia upright posturing and decreased pain with walking 2 blocks or driving 30 min without TLSO -progress    Pt will be independent and compliant with comprehensive HEP to maintain progress achieved in PT -progress        Plan: Continue cervical traction.  Contin Neuro Re-ed  Tandem stance 20 s ea   - on air ex 15 s ea  SLS 15 s ea  Reach for cone outside YOSELIN 10 ea on air ex   Standing alt UE/LE madonna 2x15 Neuro Re-ed  Tandem stance   - YONATHAN arm raise 2x10 ea   - follow finger with eyes 2x10 ea   - follow finger w

## 2021-03-18 ENCOUNTER — APPOINTMENT (OUTPATIENT)
Dept: PHYSICAL THERAPY | Age: 61
End: 2021-03-18
Attending: ORTHOPAEDIC SURGERY
Payer: COMMERCIAL

## 2021-03-22 ENCOUNTER — APPOINTMENT (OUTPATIENT)
Dept: PHYSICAL THERAPY | Age: 61
End: 2021-03-22
Attending: ORTHOPAEDIC SURGERY
Payer: COMMERCIAL

## 2021-03-24 ENCOUNTER — OFFICE VISIT (OUTPATIENT)
Dept: PHYSICAL THERAPY | Age: 61
End: 2021-03-24
Attending: ORTHOPAEDIC SURGERY
Payer: COMMERCIAL

## 2021-03-24 PROCEDURE — 97110 THERAPEUTIC EXERCISES: CPT

## 2021-03-24 PROCEDURE — 97140 MANUAL THERAPY 1/> REGIONS: CPT

## 2021-03-24 NOTE — PROGRESS NOTES
Joseph  Pt has attended 11 visits in Physical Therapy.    Dx:  s/p T3 to the pelvis fusion done on 10/14/2020            Insurance (Authorized # of Visits):  (30 visits/year)           Authorizing Physician: Dr. Mehnaz Cortes  Next MD visit: ~Sept sensation    Thoracolumbar AROM: (* denotes performed with pain)  Flexion: 75  Extension: 20  Sidebending: R 25%; L 25%  Rotation: R 75%;  L 50%    Strength: 5/5 YONATHAN UE myotomes  UE/Scapular LE   Rhomboids: R 4/5, L 4-/5  Mid trap: R 4/5; L 4/5  Low trap: R 11/11   Therex  Nustep L5 x3 min  Lateral walks YTB 1 gym lap  Monster walks YTB 2 gym laps (forward and retro)  Lunges    - forward 2x10   - lateral 2x10  Woodchop 10 ea (L shoulder pain) Therex  Nustep L5 x5 min  Overhead horizontal abd YTB 15x  UE diago 5 min Manual Therapy  Prone scar cross friction massage, YONATHAN UT, scalenes, levator scap, and cervico-thoracic paraspinal STM 12 min - Manual Therapy  Prone scar cross friction massage, YONATHAN UT, scalenes, levator scap, and cervico-thoracic paraspinal STM 6 m

## 2021-03-25 ENCOUNTER — APPOINTMENT (OUTPATIENT)
Dept: PHYSICAL THERAPY | Age: 61
End: 2021-03-25
Attending: ORTHOPAEDIC SURGERY
Payer: COMMERCIAL

## 2021-03-30 ENCOUNTER — APPOINTMENT (OUTPATIENT)
Dept: PHYSICAL THERAPY | Age: 61
End: 2021-03-30
Attending: ORTHOPAEDIC SURGERY
Payer: COMMERCIAL

## 2021-04-01 ENCOUNTER — APPOINTMENT (OUTPATIENT)
Dept: PHYSICAL THERAPY | Age: 61
End: 2021-04-01
Attending: ORTHOPAEDIC SURGERY
Payer: COMMERCIAL

## 2021-04-06 ENCOUNTER — APPOINTMENT (OUTPATIENT)
Dept: PHYSICAL THERAPY | Age: 61
End: 2021-04-06
Attending: ORTHOPAEDIC SURGERY
Payer: COMMERCIAL

## 2021-04-08 ENCOUNTER — APPOINTMENT (OUTPATIENT)
Dept: PHYSICAL THERAPY | Age: 61
End: 2021-04-08
Attending: ORTHOPAEDIC SURGERY
Payer: COMMERCIAL

## 2021-04-13 ENCOUNTER — APPOINTMENT (OUTPATIENT)
Dept: PHYSICAL THERAPY | Age: 61
End: 2021-04-13
Attending: ORTHOPAEDIC SURGERY
Payer: COMMERCIAL

## 2021-04-20 ENCOUNTER — APPOINTMENT (OUTPATIENT)
Dept: PHYSICAL THERAPY | Age: 61
End: 2021-04-20
Attending: ORTHOPAEDIC SURGERY
Payer: COMMERCIAL

## 2021-04-27 ENCOUNTER — APPOINTMENT (OUTPATIENT)
Dept: PHYSICAL THERAPY | Age: 61
End: 2021-04-27
Attending: ORTHOPAEDIC SURGERY
Payer: COMMERCIAL

## 2021-09-14 ENCOUNTER — ORDER TRANSCRIPTION (OUTPATIENT)
Dept: PHYSICAL THERAPY | Age: 61
End: 2021-09-14

## 2021-09-14 DIAGNOSIS — Z98.1 STATUS POST THORACIC SPINAL FUSION: Primary | ICD-10-CM

## 2021-09-28 ENCOUNTER — TELEPHONE (OUTPATIENT)
Dept: PHYSICAL THERAPY | Facility: HOSPITAL | Age: 61
End: 2021-09-28

## 2021-10-04 ENCOUNTER — OFFICE VISIT (OUTPATIENT)
Dept: PHYSICAL THERAPY | Age: 61
End: 2021-10-04
Attending: ORTHOPAEDIC SURGERY
Payer: COMMERCIAL

## 2021-10-04 DIAGNOSIS — Z98.1 STATUS POST THORACIC SPINAL FUSION: ICD-10-CM

## 2021-10-04 PROCEDURE — 97110 THERAPEUTIC EXERCISES: CPT

## 2021-10-04 PROCEDURE — 97161 PT EVAL LOW COMPLEX 20 MIN: CPT

## 2021-10-04 NOTE — PROGRESS NOTES
SPINE EVALUATION:   Referring Physician: Dr. David Beltran  Diagnosis: cervicalgia s/p thoracic fusion     Date of Service: 10/4/2021     PATIENT SUMMARY   Lyle Seymour is a 57year old female who returns to therapy today with complaints of upper back and reviewed with Vandana Dickerson.  Significant findings include  has a past medical history of Anesthesia complication, Anxiety state, Back problem, Constipation, Depression, Disorder of thyroid, Esophageal reflux, Essential hypertension, High blood pressure, Osteoarth tenderness - still 'numb'  Palpation: MOD tension and tenderness R cervical paraspinals with edema surrounding lower facets; L MIN tension and tenderness cervical paraspinals; MOD tenderness and tension R UT, LS, rhomboids - MIN tender and tension on L posturing and decreased pain (5/10) with walking 1 block   · Pt will be independent and compliant with comprehensive HEP to maintain progress achieved in PT     Frequency / Duration: Patient will be seen for 2 x/week or a total of 10 visits over a 90 day p

## 2021-10-07 ENCOUNTER — OFFICE VISIT (OUTPATIENT)
Dept: PHYSICAL THERAPY | Age: 61
End: 2021-10-07
Attending: ORTHOPAEDIC SURGERY
Payer: COMMERCIAL

## 2021-10-07 DIAGNOSIS — Z98.1 STATUS POST THORACIC SPINAL FUSION: ICD-10-CM

## 2021-10-07 PROCEDURE — 97140 MANUAL THERAPY 1/> REGIONS: CPT

## 2021-10-07 PROCEDURE — 97110 THERAPEUTIC EXERCISES: CPT

## 2021-10-07 NOTE — PROGRESS NOTES
Dx: cervicalgia s/p thoracic fusion          Insurance (Authorized # of Visits):  PPO            Authorizing Physician: Dr. Rodrigo Johnson  Next MD visit: none scheduled  Fall Risk: standard         Precautions: n/a             Subjective: R side of the neck is hu stretch 2x15s ea  cervical retraction x10  scapular retraction with ER x10  towel cervical ROT SNAG x5 ea   Thoracic mobilizations over foam roll seated x10        Manual Therapy  Seated upper thoracic extension mobs x15   C2-7 translation and rotation gli

## 2021-10-11 ENCOUNTER — OFFICE VISIT (OUTPATIENT)
Dept: PHYSICAL THERAPY | Age: 61
End: 2021-10-11
Attending: ORTHOPAEDIC SURGERY
Payer: COMMERCIAL

## 2021-10-11 DIAGNOSIS — Z98.1 STATUS POST THORACIC SPINAL FUSION: ICD-10-CM

## 2021-10-11 PROCEDURE — 97110 THERAPEUTIC EXERCISES: CPT

## 2021-10-11 PROCEDURE — 97140 MANUAL THERAPY 1/> REGIONS: CPT

## 2021-10-11 NOTE — PROGRESS NOTES
Dx: cervicalgia s/p thoracic fusion          Insurance (Authorized # of Visits):  PPO            Authorizing Physician: Dr. Eyal Hermosillo MD visit: none scheduled  Fall Risk: standard         Precautions: n/a             Subjective: Had some soreness after strength (mid/low trap) to 4+/5 to promote improved upright posturing and decreased pain (5/10) with walking 1 block   · Pt will be independent and compliant with comprehensive HEP to maintain progress achieved in PT -progress      Plan:  Reassess cervical

## 2021-10-14 ENCOUNTER — APPOINTMENT (OUTPATIENT)
Dept: PHYSICAL THERAPY | Age: 61
End: 2021-10-14
Attending: ORTHOPAEDIC SURGERY
Payer: COMMERCIAL

## 2021-10-18 ENCOUNTER — OFFICE VISIT (OUTPATIENT)
Dept: PHYSICAL THERAPY | Age: 61
End: 2021-10-18
Attending: ORTHOPAEDIC SURGERY
Payer: COMMERCIAL

## 2021-10-18 DIAGNOSIS — Z98.1 STATUS POST THORACIC SPINAL FUSION: ICD-10-CM

## 2021-10-18 PROCEDURE — 97140 MANUAL THERAPY 1/> REGIONS: CPT

## 2021-10-18 PROCEDURE — 97112 NEUROMUSCULAR REEDUCATION: CPT

## 2021-10-18 NOTE — PROGRESS NOTES
Dx: cervicalgia s/p thoracic fusion          Insurance (Authorized # of Visits):  PPO            Authorizing Physician: Dr. Genet Landa  Next MD visit: none scheduled  Fall Risk: standard         Precautions: n/a             Subjective: Still recovering from he comprehensive HEP to maintain progress achieved in PT -progress      Plan:  Progress postural re-training with increased sets in prone.     Date: 10/7/2021  TX#: 2/10 Date: 10/11/2021       TX#: 3/10 Date: 10/18/2021             TX#: 4/10 Date: cervical x10 min  Modalities  MHP cervical x10 min  Modalities  MHP cervical x10 min      HEP: corner pec stretch 3x30s, UT stretch seated 2x15s ea, cervical retraction x10, scapular retraction with ER x10, towel cervical ROT SNAG x5 ea    Charges: manual

## 2021-10-21 ENCOUNTER — OFFICE VISIT (OUTPATIENT)
Dept: PHYSICAL THERAPY | Age: 61
End: 2021-10-21
Attending: ORTHOPAEDIC SURGERY
Payer: COMMERCIAL

## 2021-10-21 DIAGNOSIS — Z98.1 STATUS POST THORACIC SPINAL FUSION: ICD-10-CM

## 2021-10-21 PROCEDURE — 97112 NEUROMUSCULAR REEDUCATION: CPT

## 2021-10-21 PROCEDURE — 97140 MANUAL THERAPY 1/> REGIONS: CPT

## 2021-10-21 NOTE — PROGRESS NOTES
Dx: cervicalgia s/p thoracic fusion          Insurance (Authorized # of Visits):  PPO            Authorizing Physician: Dr. Mary Guajardo  Next MD visit: none scheduled  Fall Risk: standard         Precautions: n/a             Subjective: Still getting treatment -progress  · Pt will be independent and compliant with comprehensive HEP to maintain progress achieved in PT -progress      Plan:  Continue 1-2 more visits with focus on manual therapy to improve mm flexibility and progress scapular strengthening  Date: 10 T1-3 Gr III 3x20s ea    MFR UT and LS x6 ea R/L    Neuro Re-ed  Deep neck flexor 5s x10  Wall circles -unable  Robbery x15 Neuro Re-ed  Deep neck flexor 5s x10  Robbery x15 Neuro Re-ed  Deep neck flexor 5s x15  Robbery 1# DB, 2x15  Prone scapular retract w

## 2021-10-25 ENCOUNTER — OFFICE VISIT (OUTPATIENT)
Dept: PHYSICAL THERAPY | Age: 61
End: 2021-10-25
Attending: ORTHOPAEDIC SURGERY
Payer: COMMERCIAL

## 2021-10-25 DIAGNOSIS — Z98.1 STATUS POST THORACIC SPINAL FUSION: ICD-10-CM

## 2021-10-25 PROCEDURE — 97110 THERAPEUTIC EXERCISES: CPT

## 2021-10-25 PROCEDURE — 97140 MANUAL THERAPY 1/> REGIONS: CPT

## 2021-10-25 PROCEDURE — 97112 NEUROMUSCULAR REEDUCATION: CPT

## 2021-10-25 NOTE — PROGRESS NOTES
Dx: cervicalgia s/p thoracic fusion          Insurance (Authorized # of Visits):  PPO            Authorizing Physician: Dr. Brice Hermosillo MD visit: none scheduled  Fall Risk: standard         Precautions: n/a             Subjective: Feeling good today.  Felt then D/C next visit  Date: 10/7/2021  TX#: 2/10 Date: 10/11/2021       TX#: 3/10 Date: 10/18/2021             TX#: 4/10 Date: 10/21/2021              TX#: 5/10 Date: 10/25/2021  Tx#: 6/10   Therex  UBE L1 retro x3 min  Doorway pec stretch 3x30s  UT stretch -manual x4 min  Prone PA C1-C7 and T1-3 Gr III 3x20s ea    Seated upper thoracic extension mobs x15        Neuro Re-ed  Deep neck flexor 5s x10  Wall circles -unable  Robbery x15 Neuro Re-ed  Deep neck flexor 5s x10  Robbery x15 Neuro Re-ed  Deep neck fl

## 2021-10-28 ENCOUNTER — OFFICE VISIT (OUTPATIENT)
Dept: PHYSICAL THERAPY | Age: 61
End: 2021-10-28
Attending: ORTHOPAEDIC SURGERY
Payer: COMMERCIAL

## 2021-10-28 DIAGNOSIS — Z98.1 STATUS POST THORACIC SPINAL FUSION: ICD-10-CM

## 2021-10-28 PROCEDURE — 97140 MANUAL THERAPY 1/> REGIONS: CPT

## 2021-10-28 NOTE — PROGRESS NOTES
Joseph  Pt has attended 7 visits in Physical Therapy.    Dx: cervicalgia s/p thoracic fusion          Insurance (Authorized # of Visits):  PPO            Authorizing Physician: Dr. Ana Laura Pedraza  Next MD visit: none scheduled    Subjective/Assessment: compliant with comprehensive HEP to maintain progress achieved in PT -progress    FOTO: 49/100 (43 at IE)    Plan: D/C with continued compliance to HEP    Patient/Family/Caregiver was advised of these findings, precautions, and treatment options and has ag upper thoracic extension mobs x15   -with ROT x3 ea  C2-7 translation and rotation glides Gr III x3 min R/L  Manual cervical distraction 2x20s   STM cervical paraspinals, suboccipital, UT, LS, seated x6 min   -MFR UT and LS x5 ea R/L  Supine manual UT and

## 2022-02-21 ENCOUNTER — LAB ENCOUNTER (OUTPATIENT)
Dept: LAB | Age: 62
End: 2022-02-21
Attending: ORTHOPAEDIC SURGERY
Payer: COMMERCIAL

## 2022-02-21 DIAGNOSIS — Z01.818 PREOPERATIVE TESTING: ICD-10-CM

## 2022-02-21 LAB
ALBUMIN SERPL-MCNC: 3.7 G/DL (ref 3.4–5)
ALBUMIN/GLOB SERPL: 1.1 {RATIO} (ref 1–2)
ALP LIVER SERPL-CCNC: 58 U/L
ALT SERPL-CCNC: 25 U/L
ANION GAP SERPL CALC-SCNC: 2 MMOL/L (ref 0–18)
AST SERPL-CCNC: 15 U/L (ref 15–37)
BILIRUB SERPL-MCNC: 0.5 MG/DL (ref 0.1–2)
BUN BLD-MCNC: 17 MG/DL (ref 7–18)
CALCIUM BLD-MCNC: 9.2 MG/DL (ref 8.5–10.1)
CHLORIDE SERPL-SCNC: 108 MMOL/L (ref 98–112)
CO2 SERPL-SCNC: 30 MMOL/L (ref 21–32)
CREAT BLD-MCNC: 0.6 MG/DL
FASTING STATUS PATIENT QL REPORTED: YES
GLOBULIN PLAS-MCNC: 3.5 G/DL (ref 2.8–4.4)
GLUCOSE BLD-MCNC: 92 MG/DL (ref 70–99)
OSMOLALITY SERPL CALC.SUM OF ELEC: 291 MOSM/KG (ref 275–295)
POTASSIUM SERPL-SCNC: 4.3 MMOL/L (ref 3.5–5.1)
PROT SERPL-MCNC: 7.2 G/DL (ref 6.4–8.2)
SODIUM SERPL-SCNC: 140 MMOL/L (ref 136–145)

## 2022-02-21 PROCEDURE — 80053 COMPREHEN METABOLIC PANEL: CPT

## 2022-02-21 PROCEDURE — 36415 COLL VENOUS BLD VENIPUNCTURE: CPT

## 2022-03-17 PROBLEM — M67.449 DIGITAL MUCINOUS CYST: Status: RESOLVED | Noted: 2020-04-20 | Resolved: 2022-03-17

## 2022-03-17 PROBLEM — M40.204 THORACIC KYPHOSIS: Status: RESOLVED | Noted: 2020-10-14 | Resolved: 2022-03-17

## 2022-03-17 PROBLEM — N30.01 ACUTE CYSTITIS WITH HEMATURIA: Status: RESOLVED | Noted: 2020-01-07 | Resolved: 2022-03-17

## 2022-03-23 NOTE — PROGRESS NOTES
Dx: CMC suspension arthroplasty         Authorized # of Visits:  PPO         Next MD visit: 4/26/17  Fall Risk: standard         Precautions: n/a             Subjective:\"I think the putty is really helping the fingers. \"    Objective: D1 MCP=45, IP=55 bar bend x 20       In-hand manipulation with pink eggercizer PREs 1 lb  Wrist flexion/extension  RD/UD  Pronation/supination x 20 In-hand manipulation with chips.         Pen click for IP isolation                                   Skilled Services: Cielo Lara Depth Of Tumor Invasion (For Histology): epidermis

## 2022-04-07 ENCOUNTER — ORDER TRANSCRIPTION (OUTPATIENT)
Dept: PHYSICAL THERAPY | Facility: HOSPITAL | Age: 62
End: 2022-04-07

## 2022-10-17 ENCOUNTER — APPOINTMENT (OUTPATIENT)
Dept: URBAN - METROPOLITAN AREA CLINIC 247 | Age: 62
Setting detail: DERMATOLOGY
End: 2022-10-18

## 2022-10-17 DIAGNOSIS — L57.8 OTHER SKIN CHANGES DUE TO CHRONIC EXPOSURE TO NONIONIZING RADIATION: ICD-10-CM

## 2022-10-17 DIAGNOSIS — D18.0 HEMANGIOMA: ICD-10-CM

## 2022-10-17 DIAGNOSIS — L82.0 INFLAMED SEBORRHEIC KERATOSIS: ICD-10-CM

## 2022-10-17 DIAGNOSIS — L73.8 OTHER SPECIFIED FOLLICULAR DISORDERS: ICD-10-CM

## 2022-10-17 DIAGNOSIS — L91.8 OTHER HYPERTROPHIC DISORDERS OF THE SKIN: ICD-10-CM

## 2022-10-17 DIAGNOSIS — L82.1 OTHER SEBORRHEIC KERATOSIS: ICD-10-CM

## 2022-10-17 DIAGNOSIS — D22 MELANOCYTIC NEVI: ICD-10-CM

## 2022-10-17 PROBLEM — D22.5 MELANOCYTIC NEVI OF TRUNK: Status: ACTIVE | Noted: 2022-10-17

## 2022-10-17 PROBLEM — D18.01 HEMANGIOMA OF SKIN AND SUBCUTANEOUS TISSUE: Status: ACTIVE | Noted: 2022-10-17

## 2022-10-17 PROCEDURE — OTHER COUNSELING: OTHER

## 2022-10-17 PROCEDURE — OTHER MIPS QUALITY: OTHER

## 2022-10-17 PROCEDURE — 99203 OFFICE O/P NEW LOW 30 MIN: CPT | Mod: 25

## 2022-10-17 PROCEDURE — 17110 DESTRUCT B9 LESION 1-14: CPT

## 2022-10-17 PROCEDURE — OTHER BENIGN DESTRUCTION: OTHER

## 2022-10-17 ASSESSMENT — LOCATION DETAILED DESCRIPTION DERM
LOCATION DETAILED: LEFT INFERIOR MEDIAL MALAR CHEEK
LOCATION DETAILED: RIGHT SUPERIOR UPPER BACK
LOCATION DETAILED: RIGHT SUPERIOR MEDIAL MALAR CHEEK
LOCATION DETAILED: LEFT INFERIOR ANTERIOR NECK
LOCATION DETAILED: LEFT CENTRAL MALAR CHEEK
LOCATION DETAILED: LEFT SUPERIOR MEDIAL UPPER BACK
LOCATION DETAILED: RIGHT MEDIAL SUPERIOR CHEST
LOCATION DETAILED: UPPER STERNUM
LOCATION DETAILED: LEFT MEDIAL UPPER BACK

## 2022-10-17 ASSESSMENT — LOCATION ZONE DERM
LOCATION ZONE: NECK
LOCATION ZONE: FACE
LOCATION ZONE: TRUNK

## 2022-10-17 ASSESSMENT — LOCATION SIMPLE DESCRIPTION DERM
LOCATION SIMPLE: LEFT UPPER BACK
LOCATION SIMPLE: RIGHT CHEEK
LOCATION SIMPLE: RIGHT UPPER BACK
LOCATION SIMPLE: LEFT CHEEK
LOCATION SIMPLE: CHEST
LOCATION SIMPLE: LEFT ANTERIOR NECK

## 2022-10-17 NOTE — PROCEDURE: BENIGN DESTRUCTION
Medical Necessity Information: It is in your best interest to select a reason for this procedure from the list below. All of these items fulfill various CMS LCD requirements except the new and changing color options.
Render Note In Bullet Format When Appropriate: No
Post-Care Instructions: I reviewed with the patient in detail post-care instructions. Patient is to wear sunprotection, and avoid picking at any of the treated lesions. Pt may apply Vaseline to crusted or scabbing areas.
Treatment Number (Will Not Render If 0): 0
Detail Level: Detailed
Medical Necessity Clause: This procedure was medically necessary because the lesions that were treated were:
Consent: The patient's consent was obtained including but not limited to risks of crusting, scabbing, blistering, scarring, darker or lighter pigmentary change, recurrence, incomplete removal and infection.
Anesthesia Volume In Cc: 0.5

## 2023-10-23 ENCOUNTER — APPOINTMENT (OUTPATIENT)
Dept: URBAN - METROPOLITAN AREA CLINIC 247 | Age: 63
Setting detail: DERMATOLOGY
End: 2023-10-24

## 2023-10-23 DIAGNOSIS — D18.0 HEMANGIOMA: ICD-10-CM

## 2023-10-23 DIAGNOSIS — L73.8 OTHER SPECIFIED FOLLICULAR DISORDERS: ICD-10-CM

## 2023-10-23 DIAGNOSIS — D22 MELANOCYTIC NEVI: ICD-10-CM

## 2023-10-23 DIAGNOSIS — L91.8 OTHER HYPERTROPHIC DISORDERS OF THE SKIN: ICD-10-CM

## 2023-10-23 DIAGNOSIS — D49.2 NEOPLASM OF UNSPECIFIED BEHAVIOR OF BONE, SOFT TISSUE, AND SKIN: ICD-10-CM

## 2023-10-23 DIAGNOSIS — L82.1 OTHER SEBORRHEIC KERATOSIS: ICD-10-CM

## 2023-10-23 DIAGNOSIS — L57.8 OTHER SKIN CHANGES DUE TO CHRONIC EXPOSURE TO NONIONIZING RADIATION: ICD-10-CM

## 2023-10-23 DIAGNOSIS — L82.0 INFLAMED SEBORRHEIC KERATOSIS: ICD-10-CM

## 2023-10-23 PROBLEM — D18.01 HEMANGIOMA OF SKIN AND SUBCUTANEOUS TISSUE: Status: ACTIVE | Noted: 2023-10-23

## 2023-10-23 PROBLEM — D22.5 MELANOCYTIC NEVI OF TRUNK: Status: ACTIVE | Noted: 2023-10-23

## 2023-10-23 PROCEDURE — OTHER BENIGN DESTRUCTION: OTHER

## 2023-10-23 PROCEDURE — OTHER MIPS QUALITY: OTHER

## 2023-10-23 PROCEDURE — 99213 OFFICE O/P EST LOW 20 MIN: CPT | Mod: 25

## 2023-10-23 PROCEDURE — OTHER COUNSELING: OTHER

## 2023-10-23 PROCEDURE — 11301 SHAVE SKIN LESION 0.6-1.0 CM: CPT

## 2023-10-23 PROCEDURE — 17110 DESTRUCT B9 LESION 1-14: CPT | Mod: 59

## 2023-10-23 PROCEDURE — OTHER SHAVE REMOVAL: OTHER

## 2023-10-23 ASSESSMENT — LOCATION ZONE DERM
LOCATION ZONE: NECK
LOCATION ZONE: EYELID
LOCATION ZONE: TRUNK
LOCATION ZONE: FACE

## 2023-10-23 ASSESSMENT — LOCATION DETAILED DESCRIPTION DERM
LOCATION DETAILED: RIGHT SUPERIOR UPPER BACK
LOCATION DETAILED: RIGHT MEDIAL SUPERIOR CHEST
LOCATION DETAILED: LEFT MEDIAL UPPER BACK
LOCATION DETAILED: LEFT INFERIOR ANTERIOR NECK
LOCATION DETAILED: UPPER STERNUM
LOCATION DETAILED: LEFT SUPERIOR MEDIAL UPPER BACK
LOCATION DETAILED: RIGHT MEDIAL BREAST 4-5:00 REGION
LOCATION DETAILED: LEFT LATERAL INFERIOR EYELID
LOCATION DETAILED: LEFT SUPERIOR CENTRAL MALAR CHEEK
LOCATION DETAILED: LEFT INFERIOR MEDIAL MALAR CHEEK
LOCATION DETAILED: LEFT CENTRAL MALAR CHEEK

## 2023-10-23 ASSESSMENT — LOCATION SIMPLE DESCRIPTION DERM
LOCATION SIMPLE: LEFT UPPER BACK
LOCATION SIMPLE: LEFT INFERIOR EYELID
LOCATION SIMPLE: RIGHT BREAST
LOCATION SIMPLE: LEFT ANTERIOR NECK
LOCATION SIMPLE: RIGHT UPPER BACK
LOCATION SIMPLE: CHEST
LOCATION SIMPLE: LEFT CHEEK

## 2023-10-23 NOTE — PROCEDURE: COUNSELING
Detail Level: Zone
Sunscreen Recommendations: SPF 50 or higher, applied daily or hourly when heavy sun exposure is anticipated
Nicotinamide Supplementation Recommendations: Nicotinamide is purchased over-the-counter in 500 mg capsules.  Nicotinamide should be taken as one 500 mg capsule twice a day. Supplementation with Nicotinamide does not replace sunscreen application.
Detail Level: Detailed
Detail Level: Simple

## 2023-10-23 NOTE — PROCEDURE: SHAVE REMOVAL
Medical Necessity Information: It is in your best interest to select a reason for this procedure from the list below. All of these items fulfill various CMS LCD requirements except the new and changing color options.
Medical Necessity Clause: This procedure was medically necessary because the lesion that was treated was:
Lab: -[id number]
Lab Facility: 0
Body Location Override (Optional - Billing Will Still Be Based On Selected Body Map Location If Applicable): right inframammary chest
Detail Level: Detailed
Was A Bandage Applied: Yes
Size Of Lesion In Cm (Required): 0.6
Depth Of Shave: dermis
Biopsy Method: Dermablade
Anesthesia Type: 1% lidocaine with epinephrine
Hemostasis: Drysol
Wound Care: Petrolatum
Render Path Notes In Note?: No
Consent was obtained from the patient. The risks and benefits to therapy were discussed in detail. Specifically, the risks of infection, scarring, bleeding, prolonged wound healing, incomplete removal, allergy to anesthesia, nerve injury and recurrence were addressed. Prior to the procedure, the treatment site was clearly identified and confirmed by the patient. All components of Universal Protocol/PAUSE Rule completed.
Post-Care Instructions: I reviewed with the patient in detail post-care instructions. Patient is to keep the biopsy site dry overnight, and then apply bacitracin twice daily until healed. Patient may apply hydrogen peroxide soaks to remove any crusting.
Notification Instructions: Patient will be notified of pathology results. However, patient instructed to call the office if not contacted within 2 weeks.
Billing Type: Third-Party Bill

## 2023-11-13 ENCOUNTER — APPOINTMENT (OUTPATIENT)
Dept: URBAN - METROPOLITAN AREA CLINIC 247 | Age: 63
Setting detail: DERMATOLOGY
End: 2023-11-14

## 2023-11-13 DIAGNOSIS — D22 MELANOCYTIC NEVI: ICD-10-CM

## 2023-11-13 PROBLEM — D22.5 MELANOCYTIC NEVI OF TRUNK: Status: ACTIVE | Noted: 2023-11-13

## 2023-11-13 PROCEDURE — 11401 EXC TR-EXT B9+MARG 0.6-1 CM: CPT

## 2023-11-13 PROCEDURE — OTHER PUNCH EXCISION: OTHER

## 2023-11-13 ASSESSMENT — LOCATION ZONE DERM: LOCATION ZONE: TRUNK

## 2023-11-13 ASSESSMENT — LOCATION DETAILED DESCRIPTION DERM: LOCATION DETAILED: RIGHT MEDIAL BREAST 2-3:00 REGION

## 2023-11-13 ASSESSMENT — LOCATION SIMPLE DESCRIPTION DERM: LOCATION SIMPLE: RIGHT BREAST

## 2023-11-13 NOTE — PROCEDURE: PUNCH EXCISION
Medical Necessity Clause: This procedure was medically necessary because the lesion that was treated was:
Body Location Override (Optional - Billing Will Still Be Based On Selected Body Map Location If Applicable): right inframammary chest
Detail Level: Detailed
Size Of Lesion (*Required): 0.6
X Size Of Lesion Width In Cm (Optional): 0
Punch Size In Mm: 6
Repair Type: Intermediate
Complex Requirements: Extensive Undermining Performed?: No
Undermining Type: Entire Wound
Debridement Text: The wound edges were debrided prior to proceeding with the closure to facilitate wound healing.
Helical Rim Text: The closure involved the helical rim.
Vermilion Border Text: The closure involved the vermilion border.
Nostril Rim Text: The closure involved the nostril rim.
Retention Suture Text: Retention sutures were placed to support the closure and prevent dehiscence.
Anesthesia Type: 1% lidocaine with epinephrine
Anesthesia Volume In Cc: 3
Hemostasis: Electrocautery
Epidermal Sutures: 4-0 Ethilon
Epidermal Closure: simple interrupted
Wound Care: Petrolatum
Wound Dressings: a bandage
Suture Removal: 14 days
Lab: -[id number]
Path Notes (To The Dermatopathologist): Please check margins.
1.5 Mm Punch Excision Text: A 1.5 mm punch biopsy was used to excise the lesion to the level of the subcutaneous fat.  Blunt dissection was used to free the lesion from the surrounding tissues and the lesion was removed.
2 Mm Punch Excision Text: A 2 mm punch biopsy was used to excise the lesion to the level of the subcutaneous fat.  Blunt dissection was used to free the lesion from the surrounding tissues and the lesion was removed.
2.5 Mm Punch Excision Text: A 2.5 mm punch biopsy was used to excise the lesion to the level of the subcutaneous fat.  Blunt dissection was used to free the lesion from the surrounding tissues and the lesion was removed.
3 Mm Punch Excision Text: A 3 mm punch biopsy was used to excise the lesion to the level of the subcutaneous fat.  Blunt dissection was used to free the lesion from the surrounding tissues and the lesion was removed.
3.5 Mm Punch Excision Text: A 3.5 mm punch biopsy was used to excise the lesion to the level of the subcutaneous fat.  Blunt dissection was used to free the lesion from the surrounding tissues and the lesion was removed.
4 Mm Punch Excision Text: A 4 mm punch biopsy was used to excise the lesion to the level of the subcutaneous fat.  Blunt dissection was used to free the lesion from the surrounding tissues and the lesion was removed.
4.5 Mm Punch Excision Text: A 4.5 mm punch biopsy was used to excise the lesion to the level of the subcutaneous fat.  Blunt dissection was used to free the lesion from the surrounding tissues and the lesion was removed.
5 Mm Punch Excision Text: A 5 mm punch biopsy was used to excise the lesion to the level of the subcutaneous fat.  Blunt dissection was used to free the lesion from the surrounding tissues and the lesion was removed.
6 Mm Punch Excision Text: A 6 mm punch biopsy was used to excise the lesion to the level of the subcutaneous fat.  Blunt dissection was used to free the lesion from the surrounding tissues and the lesion was removed.
7 Mm Punch Excision Text: A 7 mm punch biopsy was used to excise the lesion to the level of the subcutaneous fat.  Blunt dissection was used to free the lesion from the surrounding tissues and the lesion was removed.
8 Mm Punch Excision Text: A 8 mm punch biopsy was used to excise the lesion to the level of the subcutaneous fat.  Blunt dissection was used to free the lesion from the surrounding tissues and the lesion was removed.
10 Mm Punch Excision Text: A 10 mm punch biopsy was used to excise the lesion to the level of the subcutaneous fat.  Blunt dissection was used to free the lesion from the surrounding tissues and the lesion was removed.
12 Mm Punch Excision Text: A 12 mm punch biopsy was used to excise the lesion to the level of the subcutaneous fat.  Blunt dissection was used to free the lesion from the surrounding tissues and the lesion was removed.
Consent was obtained from the patient. The risks and benefits to therapy were discussed in detail. Specifically, the risks of infection, scarring, bleeding, prolonged wound healing, incomplete removal, allergy to anesthesia, nerve injury and recurrence were addressed. Prior to the procedure, the treatment site was clearly identified and confirmed by the patient. All components of Universal Protocol/PAUSE Rule completed.
Post-Care Instructions: I reviewed with the patient in detail post-care instructions. Patient is to keep the biopsy site dry overnight, and then apply bacitracin twice daily until healed. Patient may apply hydrogen peroxide soaks to remove any crusting.
Notification Instructions: Patient will be notified of biopsy results. However, patient instructed to call the office if not contacted within 2 weeks.
Billing Type: Third-Party Bill

## 2024-09-30 ENCOUNTER — ORDER TRANSCRIPTION (OUTPATIENT)
Dept: PHYSICAL THERAPY | Facility: HOSPITAL | Age: 64
End: 2024-09-30

## 2024-09-30 DIAGNOSIS — R42 DIZZINESS: Primary | ICD-10-CM

## 2024-10-25 ENCOUNTER — OFFICE VISIT (OUTPATIENT)
Dept: PHYSICAL THERAPY | Age: 64
End: 2024-10-25
Attending: OTOLARYNGOLOGY
Payer: MEDICARE

## 2024-10-25 ENCOUNTER — APPOINTMENT (OUTPATIENT)
Dept: PHYSICAL THERAPY | Age: 64
End: 2024-10-25
Attending: OTOLARYNGOLOGY
Payer: MEDICARE

## 2024-10-25 DIAGNOSIS — R42 DIZZINESS: Primary | ICD-10-CM

## 2024-10-25 PROCEDURE — 97161 PT EVAL LOW COMPLEX 20 MIN: CPT

## 2024-10-25 PROCEDURE — 95992 CANALITH REPOSITIONING PROC: CPT

## 2024-10-25 NOTE — PROGRESS NOTES
VESTIBULAR EVALUATION:     Diagnosis:   Dizziness (R42)   Rehab Dx: Left BPPV, Right BPPV  Gait imbalance      Referring Provider: Donnie Martines  Date of Evaluation:    10/25/2024    Precautions:  None Next MD visit:   none scheduled  Date of Surgery: n/a     PATIENT SUMMARY   Megan Preez is a 64 year old female who presents to therapy today with reports of on/off positional vertigo x1.5 years. Had PT last year which helped resolve symptoms at that time. States that the vertigo came back 4-5 months. Started with getting out of the bed. States that when she rolls to the right is when the dizziness starts. Also has been feeling imbalanced with walking the past 6 months.  Went to see ENT regarding dizziness and referral to OP PT.      Falls: Yes: in june when getting out of car also hit head .   Hx of migraines: No  Hx of vision issue: Yes: wears glasses. Has left PVD-- following opthamologist  Hx of hearing issues: none    Dizziness: Current 0/10, Best 0/10, Worst 7/10  Quality: spinning sensation  Frequency/Duration:  with positional changes and lasts about a minute  Aggravates: Supine to/from sit, Rolling, Quick head movements, and Looking/reaching up  Relieves: Not moving and Closing eyes    Headache: denies    Cervical pain: Current 6/10, Best 0/10, Worst 8-9/10  Aggravates: Neck rotation, excessive walking  Relieves: Cold     Dizziness Handicap Inventory (DHI): 36/100     Current functional limitations include difficulty with Supine to/from sit, Rolling, Quick head movements, and Looking/reaching up .   Social history:  lives with spouse  Megan describes prior level of function ability to change positions and walk without dizziness  Pt goals include improve balance and get rid of dizziness  Past medical history was reviewed with Megan. Significant findings include   Past Medical History:    Anesthesia complication    slow to wake up, hypotension 3 days ICU    Anxiety state    Back problem    low back  pain-s/p fusions    Constipation    Depression    Esophageal reflux    Essential hypertension    Osteoarthritis    Other and unspecified hyperlipidemia    PONV (postoperative nausea and vomiting)    Thyroid nodule    Unspecified sleep apnea    no machine use since 2015 after tonsil removed    Visual impairment    glasses-readers          ASSESSMENT  Megan presents to physical therapy evaluation with primary c/o positional vertigo. The results of the objective tests and measures show Positive R and left Franco Hallpike with left side being more symptomatic  Functional deficits include but are not limited to difficulty with Supine to/from sit, Rolling, Quick head movements, and Looking/reaching up.  Signs and symptoms are consistent with diagnosis of Left posterior canal and R BPPV. Pt and PT discussed evaluation findings, pathology, POC and HEP.  Pt voiced understanding and performs HEP correctly. Skilled Physical Therapy is medically necessary to address the above impairments and reach functional goals.    OBJECTIVE:   Physical Exam:  Posture/Observation: rounded shoulder posture   Neuro Screen: Sensation: Sensation intact to light touch.     Coordination Testing:   Finger to Nose: WNL   Pronation/supination: WNL   Toe tapping: WNL     Cervical spine ROM: limited all planes and painful  Adverse neuro signs with ROM: yes no: no     Occulomotor & Vestibulo-Ocular Exam:  NT this visit. To be assessed at future visit as indicated    Positional Testing:   Franco-Hallpike: R pos for dizziness (mild) lasting 5 seconds- no nystagmus seen, L Pos for dizziness moderate lasting 10 seconds upbeating torsional nystagmus seen  Roll Test (HC): pos for dizziness left side worse than right-- no nystagmus seen     Postural Control:   NT this visit. To be assessed at future visit as indicated     Functional Mobility:   Gait: pt ambulates on level ground with slower inder    Today's Treatment and Response: PT eval. Epley x 3 rounds  completed for left side. No dizziness or nystagmus seen during 3rd round. Post Epley precautions issued and reviewed  Pt education was provided on exam findings, treatment diagnosis, treatment plan, expectations, and prognosis. Pt was also provided recommendations for activity modifications and possible dizziness after evaluation.   Patient was instructed in and issued a HEP for: Post Epley precautions    Charges: PT Lenial Low Complexity, CRM 1      Total Timed Treatment: 30 min     Total Treatment Time: 50 min     Based on clinical rationale and outcome measures, this evaluation involved Low Complexity decision making.  PLAN OF CARE:    Goals: (to be met in 8 visits)  Pt to be able to make quick head turns w/o symptoms of dizziness.   Pt to be able to do bed mobility and transfer w/o gaurding or dizziness.   Pt to be able to demonstrate negative Hallpike test to facilitate ADL's without symptoms.  Pt to be able to bend over or look down without dizziness  Pt to report no dizziness x 30 days      Frequency / Duration: Patient will be seen for 1 x/week or a total of 8 visits over a 90 day period. Treatment will include: home exercise program development and instruction, patient/family education, balance training, canalith repositioning maneuver, eye/head coordination exercises, and gait training.     Education or treatment limitation: None   Rehab Potential: good     Patient/Family/Caregiver was advised of these findings, precautions, and treatment options and has agreed to actively participate in planning and for this course of care.     Thank you for your referral. Please co-sign or sign and return this letter via fax as soon as possible to [phone]. If you have any questions, please contact me at Dept: [phone]    Sincerely,  Electronically signed by therapist: Sophia Dallas, PT  Physician's certification required: Yes  I certify the need for these services furnished under this plan of treatment and while  under my care.    X___________________________________________________ Date____________________    Certification From: 10/25/2024  To:1/23/2025

## 2024-10-31 ENCOUNTER — OFFICE VISIT (OUTPATIENT)
Dept: PHYSICAL THERAPY | Age: 64
End: 2024-10-31
Attending: OTOLARYNGOLOGY
Payer: MEDICARE

## 2024-10-31 PROCEDURE — 97112 NEUROMUSCULAR REEDUCATION: CPT

## 2024-10-31 PROCEDURE — 95992 CANALITH REPOSITIONING PROC: CPT

## 2024-10-31 NOTE — PROGRESS NOTES
Diagnosis:   Dizziness (R42)   Rehab Dx: Left BPPV, Right BPPV  Gait imbalance        Referring Provider: Donnie Martines  Date of Evaluation:    10/25/2024    Precautions:  None Next MD visit:   none scheduled  Date of Surgery: n/a   Insurance Primary/Secondary: MEDICARE / [employer] [place] INDEMNITY     # Auth Visits: 8 recommended            Subjective: no new problems.  States that she had a very hard time with sleeping in recliner but did do the 2 pillows. States that she has not been feeling dizzy with getting out of bed the past few days.    Pain: na  Dizziness: 0/10      Objective: Atlantic Hallpike: L: neg    Postural Control:    Romberg: EO >30 sec, EC >30 sec   Romberg on Foam: EO >30 sec, EC >30 sec   Tandem Stance: R back EO >17 sec, EC >17 sec; L back EO >17 sec, EC >17 sec   SLS: R EO >10 sec; L EO >10 sec       Assessment: Left BPPV resolved with negative Atlantic Hallpike today. One round of Epley was completed due to being placed in Atlantic Hallpike within a week of correction. Pt did not have dizziness in any position during Epley. Posture control exam completed and WNL. Will hold chart open for 30 days incase BPPV returns      Goals:    (to be met in 8 visits)  Pt to be able to make quick head turns w/o symptoms of dizziness.   Pt to be able to do bed mobility and transfer w/o gaurding or dizziness.   Pt to be able to demonstrate negative Hallpike test to facilitate ADL's without symptoms.  Pt to be able to bend over or look down without dizziness  Pt to report no dizziness x 30 days    Plan: recheck in one week. Hold chart open for 30 days  Date: 10/31/2024  TX#: 2/8 Date:                 TX#: 3/ Date:                 TX#: 4/ Date:                 TX#: 5/ Date:   Tx#: 6/   Epley x 1 round       Posture control exam                     HEP: none    Charges: CRM 1 (10) Nreed 1 (15)       Total Timed Treatment: 25 min  Total Treatment Time: 25 min

## 2024-11-07 ENCOUNTER — APPOINTMENT (OUTPATIENT)
Dept: PHYSICAL THERAPY | Age: 64
End: 2024-11-07
Attending: OTOLARYNGOLOGY
Payer: MEDICARE

## 2024-11-14 ENCOUNTER — APPOINTMENT (OUTPATIENT)
Dept: PHYSICAL THERAPY | Age: 64
End: 2024-11-14
Attending: OTOLARYNGOLOGY
Payer: MEDICARE

## 2024-11-18 ENCOUNTER — APPOINTMENT (OUTPATIENT)
Dept: URBAN - METROPOLITAN AREA CLINIC 247 | Age: 64
Setting detail: DERMATOLOGY
End: 2024-11-19

## 2024-11-18 DIAGNOSIS — L81.4 OTHER MELANIN HYPERPIGMENTATION: ICD-10-CM

## 2024-11-18 DIAGNOSIS — D18.0 HEMANGIOMA: ICD-10-CM

## 2024-11-18 DIAGNOSIS — L82.0 INFLAMED SEBORRHEIC KERATOSIS: ICD-10-CM

## 2024-11-18 DIAGNOSIS — Z87.2 PERSONAL HISTORY OF DISEASES OF THE SKIN AND SUBCUTANEOUS TISSUE: ICD-10-CM

## 2024-11-18 DIAGNOSIS — L57.8 OTHER SKIN CHANGES DUE TO CHRONIC EXPOSURE TO NONIONIZING RADIATION: ICD-10-CM

## 2024-11-18 DIAGNOSIS — L91.8 OTHER HYPERTROPHIC DISORDERS OF THE SKIN: ICD-10-CM

## 2024-11-18 DIAGNOSIS — L82.1 OTHER SEBORRHEIC KERATOSIS: ICD-10-CM

## 2024-11-18 DIAGNOSIS — L73.8 OTHER SPECIFIED FOLLICULAR DISORDERS: ICD-10-CM

## 2024-11-18 DIAGNOSIS — D22 MELANOCYTIC NEVI: ICD-10-CM

## 2024-11-18 PROBLEM — D22.5 MELANOCYTIC NEVI OF TRUNK: Status: ACTIVE | Noted: 2024-11-18

## 2024-11-18 PROBLEM — D18.01 HEMANGIOMA OF SKIN AND SUBCUTANEOUS TISSUE: Status: ACTIVE | Noted: 2024-11-18

## 2024-11-18 PROCEDURE — OTHER LIQUID NITROGEN: OTHER

## 2024-11-18 PROCEDURE — OTHER COUNSELING: OTHER

## 2024-11-18 PROCEDURE — 99213 OFFICE O/P EST LOW 20 MIN: CPT | Mod: 25

## 2024-11-18 PROCEDURE — OTHER MIPS QUALITY: OTHER

## 2024-11-18 PROCEDURE — 17110 DESTRUCT B9 LESION 1-14: CPT

## 2024-11-18 ASSESSMENT — LOCATION ZONE DERM
LOCATION ZONE: NOSE
LOCATION ZONE: FACE
LOCATION ZONE: NECK
LOCATION ZONE: LEG
LOCATION ZONE: TRUNK

## 2024-11-18 ASSESSMENT — LOCATION SIMPLE DESCRIPTION DERM
LOCATION SIMPLE: RIGHT CHEEK
LOCATION SIMPLE: RIGHT BREAST
LOCATION SIMPLE: LEFT CHEEK
LOCATION SIMPLE: CHEST
LOCATION SIMPLE: LEFT UPPER BACK
LOCATION SIMPLE: LEFT KNEE
LOCATION SIMPLE: LEFT ANTERIOR NECK
LOCATION SIMPLE: NOSE
LOCATION SIMPLE: RIGHT UPPER BACK

## 2024-11-18 ASSESSMENT — LOCATION DETAILED DESCRIPTION DERM
LOCATION DETAILED: RIGHT INFERIOR CENTRAL MALAR CHEEK
LOCATION DETAILED: LEFT INFERIOR MEDIAL MALAR CHEEK
LOCATION DETAILED: RIGHT SUPERIOR UPPER BACK
LOCATION DETAILED: LEFT INFERIOR ANTERIOR NECK
LOCATION DETAILED: RIGHT INFRAMAMMARY CREASE (INNER QUADRANT)
LOCATION DETAILED: LEFT MEDIAL UPPER BACK
LOCATION DETAILED: NASAL DORSUM
LOCATION DETAILED: UPPER STERNUM
LOCATION DETAILED: LEFT KNEE
LOCATION DETAILED: LEFT SUPERIOR MEDIAL UPPER BACK
LOCATION DETAILED: RIGHT MEDIAL SUPERIOR CHEST

## 2024-11-18 NOTE — PROCEDURE: COUNSELING
Sunscreen Recommendation Label Override: SPF 50+
Detail Level: Detailed
Detail Level: Zone
Sunscreen Recommendations: SPF 50 or higher, applied daily or hourly when heavy sun exposure is anticipated
Nicotinamide Supplementation Recommendations: Nicotinamide is purchased over-the-counter in 500 mg capsules.  Nicotinamide should be taken as one 500 mg capsule twice a day. Supplementation with Nicotinamide does not replace sunscreen application.
Detail Level: Simple

## 2024-11-18 NOTE — HPI: FULL BODY SKIN EXAMINATION
Called patient to discuss scheduling an appointment for a regine  placement, no answer, left message with number on voicemail to return call.         WILFREDO Estrada,BSN  [facility]  
What Is The Reason For Today's Visit?: Full Body Skin Examination
What Is The Reason For Today's Visit? (Being Monitored For X): concerning skin lesions on a periodic basis

## 2024-11-20 ENCOUNTER — APPOINTMENT (OUTPATIENT)
Dept: PHYSICAL THERAPY | Age: 64
End: 2024-11-20
Attending: OTOLARYNGOLOGY
Payer: MEDICARE

## 2024-11-26 ENCOUNTER — APPOINTMENT (OUTPATIENT)
Dept: PHYSICAL THERAPY | Age: 64
End: 2024-11-26
Attending: OTOLARYNGOLOGY
Payer: MEDICARE

## 2024-12-02 ENCOUNTER — ORDER TRANSCRIPTION (OUTPATIENT)
Dept: PHYSICAL THERAPY | Facility: HOSPITAL | Age: 64
End: 2024-12-02

## 2024-12-02 DIAGNOSIS — R42 VERTIGO: Primary | ICD-10-CM

## 2024-12-03 ENCOUNTER — TELEPHONE (OUTPATIENT)
Dept: PHYSICAL THERAPY | Age: 64
End: 2024-12-03

## 2024-12-05 ENCOUNTER — APPOINTMENT (OUTPATIENT)
Dept: PHYSICAL THERAPY | Age: 64
End: 2024-12-05
Attending: OTOLARYNGOLOGY
Payer: MEDICARE

## 2024-12-05 ENCOUNTER — OFFICE VISIT (OUTPATIENT)
Dept: PHYSICAL THERAPY | Age: 64
End: 2024-12-05
Attending: OTOLARYNGOLOGY
Payer: MEDICARE

## 2024-12-05 PROCEDURE — 95992 CANALITH REPOSITIONING PROC: CPT

## 2024-12-05 NOTE — PROGRESS NOTES
Diagnosis:   Dizziness (R42)   Rehab Dx: Left BPPV, Right BPPV  Gait imbalance        Referring Provider: Donnie Martines  Date of Evaluation:    10/25/2024    Precautions:  None Next MD visit:   none scheduled  Date of Surgery: n/a   Insurance Primary/Secondary: MEDICARE / [employer] [place] INDEMNITY     # Auth Visits: 8 recommended            Subjective: states that last Tuesday she had the dizziness return with getting out of bed. Has been having the dizziness every day since then with positional changes but not as intense as it was on Tuesday.     Pain: na  Dizziness: 0/10      Objective: Franco Hallpike: L: pos- upbeating torsional nystagmus lasting 10 seconds, R: neg    Postural Control:    Romberg: EO >30 sec, EC >30 sec   Romberg on Foam: EO >30 sec, EC >30 sec   Tandem Stance: R back EO >17 sec, EC >17 sec; L back EO >17 sec, EC >17 sec   SLS: R EO >10 sec; L EO >10 sec       Assessment: right BPPV remains resolved but has positive left posterior canal BPPV. Epley x 3 rounds completed with mild dizziness lasting 2-3 seconds during 3rd round. Post Epley precautions reviewed and issued.     Goals:    (to be met in 8 visits)  Pt to be able to make quick head turns w/o symptoms of dizziness.   Pt to be able to do bed mobility and transfer w/o gaurding or dizziness.   Pt to be able to demonstrate negative Hallpike test to facilitate ADL's without symptoms.  Pt to be able to bend over or look down without dizziness  Pt to report no dizziness x 30 days    Plan: recheck in one week.  Date: 10/31/2024  TX#: 2/8 Date: 12/5/2024                TX#: 3/8 Date:                 TX#: 4/ Date:                 TX#: 5/ Date:   Tx#: 6/   Epley x 1 round Epley to left side x 3 rounds      Posture control exam                     HEP: none    Charges: CRM 1 (25)       Total Timed Treatment: 25 min  Total Treatment Time: 25 min

## 2024-12-12 ENCOUNTER — OFFICE VISIT (OUTPATIENT)
Dept: PHYSICAL THERAPY | Age: 64
End: 2024-12-12
Attending: OTOLARYNGOLOGY
Payer: MEDICARE

## 2024-12-12 ENCOUNTER — APPOINTMENT (OUTPATIENT)
Dept: PHYSICAL THERAPY | Age: 64
End: 2024-12-12
Attending: OTOLARYNGOLOGY
Payer: MEDICARE

## 2024-12-12 PROCEDURE — 95992 CANALITH REPOSITIONING PROC: CPT

## 2024-12-12 PROCEDURE — 97112 NEUROMUSCULAR REEDUCATION: CPT

## 2024-12-12 NOTE — PROGRESS NOTES
Diagnosis:   Dizziness (R42)   Rehab Dx: Left BPPV, Right BPPV  Gait imbalance        Referring Provider: Donnie Martines  Date of Evaluation:    10/25/2024    Precautions:  None Next MD visit:   none scheduled  Date of Surgery: n/a   Insurance Primary/Secondary: MEDICARE / [employer] [place] INDEMNITY     # Auth Visits: 8 recommended            Subjective: states that she has been feeling better. Did have a little dizziness with rolling onto left side last night. Feels fine today.   Pain: na  Dizziness: 0/10      Objective: Franco Hallpike: L: neg, R: neg    Postural Control:    Romberg: EO >30 sec, EC >30 sec   Romberg on Foam: EO >30 sec, EC >30 sec   Tandem Stance: R back EO >17 sec, EC >17 sec; L back EO >17 sec, EC >17 sec   SLS: R EO >10 sec; L EO >10 sec       Assessment: rechecked left [place] Hallpike and negative for dizziness/nystagmus. Did complete 1 round of Epley since pt did feel dizziness last night with left side lying. Posture control exam completed today and WNL. Will hold chart open x 30 days in case BPPV returns.      Goals:    (to be met in 8 visits)  Pt to be able to make quick head turns w/o symptoms of dizziness.   Pt to be able to do bed mobility and transfer w/o gaurding or dizziness.   Pt to be able to demonstrate negative Hallpike test to facilitate ADL's without symptoms.  Pt to be able to bend over or look down without dizziness  Pt to report no dizziness x 30 days    Plan: Hold chart open x 30 days incase dizziness returns  Date: 10/31/2024  TX#: 2/8 Date: 12/5/2024                TX#: 3/8 Date: 12/12/2024               TX#: 4/8 Date:                 TX#: 5/ Date:   Tx#: 6/   Epley x 1 round Epley to left side x 3 rounds EPley x 1 round left side     Posture control exam  Posture control exam                   HEP: none    Charges: CRM 1 (10)     Nreed 1 (15)  Total Timed Treatment: 25 min  Total Treatment Time: 25 min

## 2024-12-16 ENCOUNTER — APPOINTMENT (OUTPATIENT)
Dept: PHYSICAL THERAPY | Age: 64
End: 2024-12-16
Attending: OTOLARYNGOLOGY
Payer: MEDICARE

## 2024-12-19 ENCOUNTER — APPOINTMENT (OUTPATIENT)
Dept: PHYSICAL THERAPY | Age: 64
End: 2024-12-19
Attending: OTOLARYNGOLOGY
Payer: MEDICARE

## 2025-04-30 ENCOUNTER — ORDER TRANSCRIPTION (OUTPATIENT)
Dept: PHYSICAL THERAPY | Facility: HOSPITAL | Age: 65
End: 2025-04-30

## 2025-04-30 DIAGNOSIS — R42 VERTIGO: Primary | ICD-10-CM

## 2025-05-06 ENCOUNTER — OFFICE VISIT (OUTPATIENT)
Dept: PHYSICAL THERAPY | Age: 65
End: 2025-05-06
Attending: FAMILY MEDICINE
Payer: MEDICARE

## 2025-05-06 DIAGNOSIS — R42 VERTIGO: Primary | ICD-10-CM

## 2025-05-06 PROCEDURE — 95992 CANALITH REPOSITIONING PROC: CPT

## 2025-05-06 PROCEDURE — 97161 PT EVAL LOW COMPLEX 20 MIN: CPT

## 2025-05-06 NOTE — PROGRESS NOTES
VESTIBULAR EVALUATION:     Diagnosis:   Vertigo (R42); Rehab Dx: left posterior canal BPPV, possible lateral canal BPPV, left Patient:  Megan Perez (64 year old, female)        Referring Provider: Davis Barillas  Today's Date   5/6/2025    Precautions:  None   Date of Evaluation: 05/06/25  Next MD visit: No data recorded  Date of Surgery: No data recorded     PATIENT SUMMARY   Summary of chief complaints: positional vertigo  History of current condition: States that 2 weeks ago she went to lay down in bed and started to have the spinning sensation similar to what she had in November 2024. Had PT in Nov/Dec 2024 which resolved her symptoms. called GP regarding dizizness and he referred her to OP PT. feels it mostly on the left side.   Pain level: current 8 /10 (right knee), at best 2 /10, at worst 10 /10  Social History: [place]  [place]   Occupation: retired   Leisure activities/Hobbies:     Prior level of function: abilty to change positions without dizziness  Current limitations: difficulty with changing positional changes  Pt goals: to et rid of dizziness  Symptoms with cough/sneeze or loud noise: No  Falls: No      Hx of migraines: No     Hx of vision issue: Yes onset of macular degeneration  Hx of hearing issues: none    Dizziness: Current: 0 /10, Best: 0 /10, Worst:5 /10  Quality: spinning sensation  Frequency/Duration: occurs with positional changes, lasts 20-30 seconds   Aggravates: supine to/from sit; rolling; quick head movements; looking/reaching up   Relieves: not moving     History of Headaches: negative     History of Cervical Pain: positive  Cervical pain: Current:1 /10, Best: 0 /10, Worst: 5 /10  Aggravates: neck movement   Relieves: cold    Dizziness Handicap Inventory: (Patient-Rptd) 20-Mild Handicap.    Past medical history was reviewed with Megan.  Significant findings include:    Imaging/Tests:     Megan  has a past medical history of Anesthesia complication, Anxiety  state, Back problem, Constipation, Depression, Esophageal reflux, Essential hypertension, Osteoarthritis, Other and unspecified hyperlipidemia, PONV (postoperative nausea and vomiting), Thyroid nodule, Unspecified sleep apnea ( DX 12-13-12 DX 2-19-15), and Visual impairment.  She  has a past surgical history that includes reduction of large breast (1986); hysterectomy (1997); cholecystectomy (2000); reduction right; colonoscopy (6/2/2010 ); tonsillectomy (9/26/2014); colonoscopy (N/A, 5/1/2015); reduction left (86); inj paravert f jnt l/s 1 lev (Bilateral, 9/11/2015); m-sedaj by sm phys perfrmg svc 5+ yr (Bilateral, 9/11/2015); inj paravert f jnt l/s 1 lev (Bilateral, 10/19/2015); inj paravert f jnt l/s 2 lev (Bilateral, 10/19/2015); m-sedaj by sm phys perfrmg svc 5+ yr (Bilateral, 10/19/2015); back surgery (2005,2013); back surgery (5/17/16); other (Left, 03/01/2022); colonoscopy (N/A, 2/13/2019); other surgical history (10/5/12); and other surgical history (Left, 2017).    ASSESSMENT  Megan presents to physical therapy evaluation with primary c/o positional vertigo. The results of the objective tests and measures show Positive Left Franco Hallpike and Pos dizziness with left sidelying for possible left lateral canal. Functional deficits include but are not limited to difficulty with changing positional changes. Signs and symptoms are consistent with diagnosis of Vertigo (R42); Rehab Dx: left posterior canal BPPV, possible lateral canal BPPV, left. Pt and PT discussed evaluation findings, pathology, POC and HEP.  Pt voiced understanding and performs HEP correctly without reported pain. Skilled Physical Therapy is medically necessary to address the above impairments and reach functional goals.    OBJECTIVE:    Musculoskeltal:  Posture/Observation: rounded shoulder posture   Cervical ROM     Flex WNL     Ext WNL    R L     Side bend         Rotation WNL WNL     Adverse neuro signs with ROM: No    Neurological:  Neuro  Screen: Sensation: WNL for light touch screen    Coordination Testing:   Finger to Nose: WNL   Pronation/supination: WNL  Toe tapping:  WNL      Oculomotor & Vestibular Exam:  NT this visit. To be assessed at future visit as indicated    Positional Testing:  Franco Hallpike - Left   Latency (seconds) 0 sec   Duration (seconds) 12 sec   Direction no nystagmus seen   Symptom provocation Yes         Horizontal Roll Test   Latency (seconds) 0 sec   Duration (seconds) 15 sec   Direction     Symptom provocation Yes   Nystagmus Intensity Worse      Franco Hallpike Right - negative, no symptoms    Balance and Functional Mobility:  Postural Control: NT this visit. To be assessed at future visit as indicated    Functional Mobility:   Gait: pt ambulates on level ground with assistive device; decreased stance phase; decreased step length.     Today’s Treatment and Response:   Pt education was provided on exam findings, treatment diagnosis, treatment plan, expectations, and prognosis.   Today's Treatment       5/6/2025   Vestibular Treatment   Additional Treatment Epley x 3 rounds on left. Dizziness improved with each round 12 sec, 9 sec, 7 sec-- no nystagmus seen   Evaluation Minutes 20   Canalith Repositioning Minutes 20   Total Time Of Timed Procedures 0   Total Time Of Service-Based Procedures 40   Total Treatment Time 40   HEP Sleep on 2 pillows x 5 days, avoid sleeping on left side for 5 days      Patient was instructed in and issued a HEP for: Sleep on 2 pillows x 5 days, avoid sleeping on left side for 5 days  Pt was also provided recommendations for: importance of remaining active; symptom management; possible dizziness after evaluation.    Charges:  PT EVAL: Low Complexity, Eval 1, CRM 1  In agreement with evaluation findings and clinical rationale, this evaluation involved LOW COMPLEXITY decision making due to no personal factors/comorbidities, 1-2 body structures involved/activity limitations, and stable symptoms as  documented in the evaluation.     PLAN OF CARE:    Goals: (to be met in 8 visits)   Pt to be able to make quick head turns w/o symptoms of dizziness.   Pt to be able to do bed mobility and transfer w/o gaurding or dizziness.   Pt to be able to demonstrate negative Hallpike test to facilitate ADL's without symptoms.  Pt to be able to bend over or look down without dizziness  Pt to report no dizziness x 30 days       Frequency / Duration: Patient will be seen 1x/week or a total of 8 visits over a 90 day period. Treatment will include: neuromuscular re-education; balance training; eye/head coordination training; canalith repositioning maneuver; habituation training for motion sensitivity and/or visual motion intolerance; symptom management instruction; patient education; therapeutic exercise; home exercise program development and instruction    Education or treatment limitation: None   Rehab Potential: excellent     Patient/Family/Caregiver was advised of these findings, precautions, and treatment options and has agreed to actively participate in planning and for this course of care.     Thank you for your referral. Please co-sign or sign and return this letter via fax as soon as possible to [phone]. If you have any questions, please contact me at Dept: [phone]    Sincerely,  Electronically signed by therapist: Sophia Dallas, PT  Physician's certification required: Yes  I certify the need for these services furnished under this plan of treatment and while under my care.    X___________________________________________________ Date____________________    Certification From: 5/6/2025  To:8/4/2025

## 2025-05-13 ENCOUNTER — OFFICE VISIT (OUTPATIENT)
Dept: PHYSICAL THERAPY | Age: 65
End: 2025-05-13
Attending: FAMILY MEDICINE
Payer: MEDICARE

## 2025-05-13 PROCEDURE — 95992 CANALITH REPOSITIONING PROC: CPT

## 2025-05-13 NOTE — PROGRESS NOTES
Patient: Megan Perez (64 year old, female) Referring Provider:  Insurance:   Diagnosis: Vertigo (R42); Rehab Dx: left posterior canal BPPV, possible lateral canal BPPV, left Davis Dianaers MEDICARE   Date of Surgery: No data recorded Next MD visit:  MORALES VELEZ INDEMNITY   Precautions:  None No data recorded Referral Information:    Date of Evaluation: Req: 1, Auth: 1, Exp: 4/30/2026 05/06/25 POC Auth Visits:          Today's Date   5/13/2025    Subjective  States that she felt pretty good after eval. the dizizness is alot better. Slept on 2 pillows for a night but then had to move to 1 pillow becasue neck was bothering her.       Pain: pain not reported     Objective  [place] Hallpike and log roll negative today          Assessment  Franco Hallpike and log roll negative today. Did complete 1 round of Epley as correction is less than 1 week and placed in dependent position. Pt declined posture control exam due to her knee pain and issues with it giving way-- is scheduled for TKA next month.    Goals (to be met in 8 visits)   Pt to be able to make quick head turns w/o symptoms of dizziness.   Pt to be able to do bed mobility and transfer w/o gaurding or dizziness.   Pt to be able to demonstrate negative Hallpike test to facilitate ADL's without symptoms.  Pt to be able to bend over or look down without dizziness  Pt to report no dizziness x 30 days           Plan  Hold chart open 30 days incase dizziness recurs.    Treatment Last 4 Visits  Treatment Day: 2       5/6/2025 5/13/2025   Vestibular Treatment   Additional Treatment Epley x 3 rounds on left. Dizziness improved with each round 12 sec, 9 sec, 7 sec-- no nystagmus seen CRM: 1 round Epley start on left side. No dizziness   Evaluation Minutes 20    Canalith Repositioning Minutes 20 10   Total Time Of Timed Procedures 0 0   Total Time Of Service-Based Procedures 40 10   Total Treatment Time 40 10   HEP Sleep on 2 pillows x 5 days, avoid sleeping on left side for 5  days         HEP  Sleep on 2 pillows x 5 days, avoid sleeping on left side for 5 days    Charges  CRM 1

## (undated) DIAGNOSIS — M18.11 PRIMARY OSTEOARTHRITIS OF FIRST CARPOMETACARPAL JOINT OF RIGHT HAND: Primary | ICD-10-CM

## (undated) DEVICE — INTENDED TO AID IN THE PASSING OF SUTURES THROUGH BONE AND SOFT TISSUE DURING ORTHOPEDIC SURGERY: Brand: HOFFEE SUTURE RETRIEVER

## (undated) DEVICE — ZIMMER® STERILE DISPOSABLE TOURNIQUET CUFF WITH PLC, DUAL PORT, SINGLE BLADDER, 18 IN. (46 CM)

## (undated) DEVICE — SPONGE LAP 4X18 XRAY STRL

## (undated) DEVICE — UPPER EXTREMITY CDS-LF: Brand: MEDLINE INDUSTRIES, INC.

## (undated) DEVICE — Device

## (undated) DEVICE — UNDYED BRAIDED (POLYGLACTIN 910), SYNTHETIC ABSORBABLE SUTURE: Brand: COATED VICRYL

## (undated) DEVICE — SOL  .9 1000ML BTL

## (undated) DEVICE — DRAPE SRG 26X15IN UTL TPE STRL

## (undated) DEVICE — SUTURE MONOCRYL 2-0 Y945H

## (undated) DEVICE — VIOLET BRAIDED (POLYGLACTIN 910), SYNTHETIC ABSORBABLE SUTURE: Brand: COATED VICRYL

## (undated) DEVICE — CONVERTORS STOCKINETTE: Brand: CONVERTORS

## (undated) DEVICE — C-ARMOR C-ARM EQUIPMENT COVERS CLEAR STERILE UNIVERSAL FIT 12 PER CASE: Brand: C-ARMOR

## (undated) DEVICE — OCCLUSIVE GAUZE STRIP OVERWRAP,3% BISMUTH TRIBROMOPHENATE IN PETROLATUM BLEND: Brand: XEROFORM

## (undated) DEVICE — SUTURE VICRYL 0 CT-1

## (undated) DEVICE — GOWN SURG AERO CHROME XXL

## (undated) DEVICE — LAMINECTOMY: Brand: MEDLINE INDUSTRIES, INC.

## (undated) DEVICE — PADDING CAST COTTON  4

## (undated) DEVICE — DRAPE SRG 90X60IN BCK TBL CVR

## (undated) DEVICE — FLOSEAL HEMOSTATIC MATRIX, 5ML: Brand: FLOSEAL HEMOSTATIC MATRIX

## (undated) DEVICE — ENDOSCOPY PACK UPPER: Brand: MEDLINE INDUSTRIES, INC.

## (undated) DEVICE — ENDOSCOPY PACK - LOWER: Brand: MEDLINE INDUSTRIES, INC.

## (undated) DEVICE — DERMABOND LIQUID ADHESIVE

## (undated) DEVICE — SUPER SPONGES,MEDIUM: Brand: KERLIX

## (undated) DEVICE — SUTURE MONOCRYL 4-0 PS-2

## (undated) DEVICE — CASED DISP BIPOLAR CORD

## (undated) DEVICE — SUTURE MONOCRYL 4-0 Y935H

## (undated) DEVICE — Device: Brand: DEFENDO AIR/WATER/SUCTION AND BIOPSY VALVE

## (undated) DEVICE — CONTAINER SPEC STR 4OZ GRY LID

## (undated) DEVICE — SYRINGE/GUAGE ASSEMBLY

## (undated) DEVICE — SYRINGE 10ML SLIP TIP

## (undated) DEVICE — CATH BALLOON CRE 18-20MM 5838

## (undated) DEVICE — ADHESIVE MASTISOL 2/3CC VL

## (undated) DEVICE — KENDALL SCD EXPRESS SLEEVES, KNEE LENGTH, MEDIUM: Brand: KENDALL SCD

## (undated) DEVICE — DIFFUSER: Brand: CORE, MAESTRO

## (undated) DEVICE — KIT DRN 3/16IN PVC DRN 3 SPRG

## (undated) DEVICE — NON-ADHERENT PAD PREPACK: Brand: TELFA

## (undated) DEVICE — GAUZE SPONGES,12 PLY: Brand: CURITY

## (undated) DEVICE — FORCEP RADIAL JAW 4

## (undated) DEVICE — GAMMEX® PI HYBRID SIZE 9, STERILE POWDER-FREE SURGICAL GLOVE, POLYISOPRENE AND NEOPRENE BLEND: Brand: GAMMEX

## (undated) DEVICE — GLOVE BIOGEL M SURG SZ 8

## (undated) DEVICE — DRAPE SHEET LG

## (undated) DEVICE — SUTURE ETHIBOND EXCEL 3-0 RB

## (undated) DEVICE — MICRO KOVER: Brand: UNBRANDED

## (undated) DEVICE — REM POLYHESIVE ADULT PATIENT RETURN ELECTRODE: Brand: VALLEYLAB

## (undated) DEVICE — TRAY FOLEY BDX 16F STATLOCK

## (undated) DEVICE — 3 ML SYRINGE LUER-LOCK TIP: Brand: MONOJECT

## (undated) DEVICE — STANDARD HYPODERMIC NEEDLE,POLYPROPYLENE HUB: Brand: MONOJECT

## (undated) DEVICE — OIL CARTRIDGE: Brand: CORE, MAESTRO

## (undated) DEVICE — FILTERLINE NASAL ADULT O2/CO2

## (undated) DEVICE — 3M™ RED DOT™ MONITORING ELECTRODE WITH FOAM TAPE AND STICKY GEL, 50/BAG, 20/CASE, 72/PLT 2570: Brand: RED DOT™

## (undated) DEVICE — 1 ML INSULIN SYRINGE REGULAR LUER TIP: Brand: MONOJECT

## (undated) DEVICE — PADDING CAST SOFT ROLL 3\" STER

## (undated) DEVICE — SPLINT PRECUT ORTHOGLASS 3X12

## (undated) DEVICE — 3M™ STERI-STRIP™ REINFORCED ADHESIVE SKIN CLOSURES, R1547, 1/2 IN X 4 IN (12 MM X 100 MM), 6 STRIPS/ENVELOPE: Brand: 3M™ STERI-STRIP™

## (undated) DEVICE — COVER SLV UNV DISP NTR STRL LF

## (undated) DEVICE — 1200CC GUARDIAN II: Brand: GUARDIAN

## (undated) DEVICE — 11.1-MULTIMODALITY IOM KIT

## (undated) DEVICE — SNAP KOVER: Brand: UNBRANDED

## (undated) DEVICE — 3.0MM PRECISION NEURO (MATCH HEAD)

## (undated) DEVICE — NEEDLE HPO 18GA 1.5IN ECLPS

## (undated) DEVICE — SUTURE VICRYL 1 OS-6

## (undated) DEVICE — GAMMEX® PI HYBRID SIZE 7, STERILE POWDER-FREE SURGICAL GLOVE, POLYISOPRENE AND NEOPRENE BLEND: Brand: GAMMEX

## (undated) DEVICE — SCREW SET SPNE VRST GRY
Type: IMPLANTABLE DEVICE | Site: BACK | Status: NON-FUNCTIONAL
Removed: 2020-10-14

## (undated) DEVICE — IMPLANTABLE DEVICE
Type: IMPLANTABLE DEVICE | Site: BACK | Status: NON-FUNCTIONAL
Removed: 2020-10-14

## (undated) DEVICE — CS5/5+ FASTPACK, 125ML, 150µ RES: Brand: HAEMONETICS CELL SAVER 5/5+ SYSTEMS

## (undated) DEVICE — GLOVE SURG TRIUMPH SZ 71/2

## (undated) DEVICE — SUTURE PDS II 1-0 Z881G

## (undated) DEVICE — NEEDLE CONFIDENCE SPINAL

## (undated) DEVICE — NEEDLE SPINAL 20X3-1/2 YELLOW

## (undated) DEVICE — PENCIL ESURG 10FT 3/32IN SS

## (undated) DEVICE — Device: Brand: JELCO

## (undated) DEVICE — SUTURE VICRYL 2-0 FS-1

## (undated) DEVICE — CAUTERY BLADE 2IN INS E1455

## (undated) DEVICE — ENCORE® LATEX MICRO SIZE 6.5, STERILE LATEX POWDER-FREE SURGICAL GLOVE: Brand: ENCORE

## (undated) DEVICE — ASPIRATION/ANTICOAGULATION SET: Brand: HAEMONETICS CELL SAVER SYSTEM

## (undated) DEVICE — ESPOCAN® 18 GA. X 3-1/2 IN. TUOHY WITH BACKEYE LUMEN, PENCAN® 27 GA. X 5 IN. PENCIL-POINT SPINAL NEEDLE: Brand: ESPOCAN®

## (undated) NOTE — LETTER
2/18/2019          Bhavani oDnohue  [facility] [address]    Dear Camacho Callaway,       Here are the biopsy/pathology findings from your recent EGD (Upper  Endoscopy):    gastritis - an inflammation of the lining of the stomach      If you

## (undated) NOTE — MR AVS SNAPSHOT
JESSICA [facility]  [id number]  [facility] [address]  [phone]               Thank you for choosing us for your health care visit with Raquel Angela. We are glad to serve you and happy to provide you with this summary of your visit.   Magdalena Take 1 capsule by mouth daily. Commonly known as:  PROBIOTIC           ascorbic acid (VITAMIN C) 250 MG Tabs   Take 250 mg by mouth daily. DIOVAN 160 MG Tabs   Generic drug:  valsartan   Take 1 tablet (160 mg total) by mouth daily.            do

## (undated) NOTE — LETTER
[address] [place] [address]      Authorization for Surgical Operation and Procedure     Date:___________                                                                                                         Time:_______ 4.   Should the need arise during my operation or immediate post-operative period, I also consent to the administration of blood and/or blood products.   Further, I understand that despite careful testing and screening of blood or blood products by ludin 8.   I recognize that in the event my procedure results in extended X-Ray/fluoroscopy time, I may develop a skin reaction. 9.  If I have a Do Not Attempt Resuscitation (DNAR) order in place, that status will be suspended while in the operating room, proc STATEMENT OF PHYSICIAN My signature below affirms that prior to the time of the procedure; I have explained to the patient and/or his/her legal representative, the risks and benefits involved in the proposed treatment and any reasonable alternative to the

## (undated) NOTE — LETTER
Patient Name: Kailey Morgan  YOB: 1960          MRN :  [id number]  Date:  3/24/2021  Referring Physician:  Ann Ruiz  Pt has attended 11 visits in Physical Therapy.    Dx:  s/p T3 to the pelvis fusion done on 10/14/ Observation/Posture: Mild forward head and rounded shoulders.  Healed incision without tenderness; still hypomobile with minimal improvement in sensation    Thoracolumbar AROM: (* denotes performed with pain)  Flexion: 75  Extension: 20  Sidebending: R 25%; Carmelo, PT

## (undated) NOTE — Clinical Note
Patient Name: Addy Hampton  YOB: 1960          MRN number:  [id number]  Date:  4/24/2017  Referring Physician:  Joann Noel    Progress Summary  Pt has attended 6, cancelled 0, and no shown 0 visits in Occupational Therapy.      Subjective: \"

## (undated) NOTE — LETTER
Patient Name: Verona Encino  YOB: 1960          MRN number:  [id number]  Date:  8/4/2020  Referring Physician:  Patrice Lovelace     LOWER EXTREMITY EVALUATION:   Referring Physician: Dr. Radha Plascencia  Diagnosis: YONATHAN  Plantar fasciitis    Date of Serv blood pressure, High cholesterol,  Osteoarthritis, hyperlipidemia, Thyroid nodule, Unspecified sleep apnea  and Visual impairment. Khloe Horner presents to physical therapy evaluation with primary c/o pain in YONATHAN feet, worst in her heels.  The resu Gait: pt ambulates on level ground with decreased step length YONATHAN , decreased arm swing R and slight R trunk lean.   Balance: SLS R  2s, L  2s - pain in top of feet YONATHAN    Today’s Treatment and Response:   Pt education was provided on exam findings, treatme Rehab Potential:good due to improvements and compliance with prior therapy (back)    Patient/Family/Caregiver was advised of these findings, precautions, and treatment options and has agreed to actively participate in planning and for this course of care.

## (undated) NOTE — LETTER
Patient Name: Marilyn Cano  YOB: 1960          MRN :  [id number]  Date:  10/28/2021  Referring Physician:  Shasta Ruiz  Pt has attended 7 visits in Physical Therapy.    Dx: cervicalgia s/p thoracic fusion          Barlow will be independent and compliant with comprehensive HEP to maintain progress achieved in PT -progress    FOTO: 49/100 (43 at IE)    Plan: D/C with continued compliance to HEP    Patient/Family/Caregiver was advised of these findings, precautions, and fatemeh

## (undated) NOTE — MR AVS SNAPSHOT
JESS[facility]  [id number]  [facility] [address]  [phone]               Thank you for choosing us for your health care visit with Raquel Angela. We are glad to serve you and happy to provide you with this summary of your visit.   Magdalena Estrogens Conjugated 0.625 MG Tabs   Take 1 tablet (0.625 mg total) by mouth daily. Commonly known as:  PREMARIN           Fenofibrate 160 MG Tabs   1 tablet po qd           Fiber (Guar Gum) Chew   Chew 2 tablets by mouth daily.            omega-3

## (undated) NOTE — MR AVS SNAPSHOT
JESSICA [facility]  [id number]  [facility] [address]  [phone]               Thank you for choosing us for your health care visit with Raquel Angela. We are glad to serve you and happy to provide you with this summary of your visit.   Magdalena Take 1 capsule by mouth daily. Commonly known as:  PROBIOTIC           DIOVAN 160 MG Tabs   Generic drug:  valsartan   Take 1 tablet (160 mg total) by mouth daily. docusate sodium 100 MG Caps   Take 100 mg by mouth daily.    Commonly known as:

## (undated) NOTE — LETTER
Last Revised 02/07/06  Obstructive Sleep Apnea Questionnaire    Clinical signs and symptoms suggesting the possibility of ANGELINE    1. Predisposing physical characteristics (positive with any of the following present)  ? BMI 35kg/m²  ?  Craniofacial abnormalit pauses which are frightening to the observer, patient regularly falls asleep within minutes after being left unstimulated) in which case they should be treated as though they have severe sleep apnea.     The sleep laboratory’s assessment (none, mild, modera Point Total for B           C. Requirement for postoperative opioids.                Opioid requirement             Points   None 0    Low dose oral opiod 1    High dose oral opioids, parenteral or neuraxial opiods 3      Point Total for C        Estimation

## (undated) NOTE — LETTER
Patient Name: Clifford Mendoza  YOB: 1960          MRN :  [id number]  Date:  9/8/2020  Referring Physician:  Jean-Claude Naranjo  Pt has attended 5 visits in Physical Therapy.    Dx: 6 Hannah Love (Aut ambulate 30 minutes with shoes (ie grocery store) with <3/10 pain -MET  · Pt will improve calf flexibility and foot sensitivity to be able to sleep through the night without waking due to pain -MET  · Pt will be independent and compliant with comprehensive

## (undated) NOTE — IP AVS SNAPSHOT
[facility] [place] [address] [place], [address] ~ [phone]                Discharge Summary   2/28/2017    Radha May           Admission Information        Provider Department    2/28/2017 Tonya Gaytan MD Eh Jerome Espinoza / Sherrill Weinstein [    ]    [    ]    [    ]       Fiber (Guar Gum) Chew        Chew 2 tablets by mouth daily. [    ]    [    ]    [    ]    [    ]       omega-3 fatty acids 1000 MG Caps   Commonly known as:  FISH OIL        Take 1,000 mg by mouth twice a week.       [ · If you don't drink water; you could get dehydrated    Alcoholic beverages should be avoided for:  · 24 hours after Anesthesia/Sedation    Call the doctor for:  · Elevated Temperature  · Bleeding  · Nausea/Vomiting  · Pain not relieved with pain medicatio Metabolic Lab Results  (Last result in the past 90 days)    ALT Bilirubin,Total Total Protein Albumin Sodium Potassium Chloride    -- -- -- -- (02/10/17)  140 (02/10/17)  4.1 (02/10/17)  105      Radiology Exams     None         Additional Information

## (undated) NOTE — MR AVS SNAPSHOT
JESSICA [facility]  [id number]  [facility] [address]  [phone]               Thank you for choosing us for your health care visit with Raquel Angela. We are glad to serve you and happy to provide you with this summary of your visit.   Magdalena Take 1 capsule by mouth daily. Commonly known as:  PROBIOTIC           ascorbic acid (VITAMIN C) 250 MG Tabs   Take 250 mg by mouth daily. DIOVAN 160 MG Tabs   Generic drug:  valsartan   Take 1 tablet (160 mg total) by mouth daily.            do

## (undated) NOTE — MR AVS SNAPSHOT
JESSICA [facility]  [id number]  [facility] [address]  [phone]               Thank you for choosing us for your health care visit with Raquel Angela. We are glad to serve you and happy to provide you with this summary of your visit.   Magdalena Chew 2 tablets by mouth daily. omega-3 fatty acids 1000 MG Caps   Take 1,000 mg by mouth twice a week. Commonly known as:  FISH OIL           Sertraline HCl 50 MG Tabs   Take 1 tablet (50 mg total) by mouth daily.    Commonly known as:  ZOLOFT

## (undated) NOTE — MR AVS SNAPSHOT
JESSICA [facility]  [id number]  [facility] [address]  [phone]               Thank you for choosing us for your health care visit with Raquel Angela. We are glad to serve you and happy to provide you with this summary of your visit.   Magdalena Chew 2 tablets by mouth daily. omega-3 fatty acids 1000 MG Caps   Take 1,000 mg by mouth twice a week. Commonly known as:  FISH OIL           Sertraline HCl 50 MG Tabs   Take 1 tablet (50 mg total) by mouth daily.    Commonly known as:  ZOLOFT

## (undated) NOTE — MR AVS SNAPSHOT
JESSICA [facility]  [id number]  [facility] [address]  [phone]               Thank you for choosing us for your health care visit with Raquel Angela. We are glad to serve you and happy to provide you with this summary of your visit.   Magdalena Take 1 tablet (160 mg total) by mouth daily. docusate sodium 100 MG Caps   Take 100 mg by mouth daily. Commonly known as:  COLACE           ergocalciferol 00258 units Caps   1 capsule 2 times weekly.    Commonly known as:  DRISDOL/VITAMIN D2

## (undated) NOTE — MR AVS SNAPSHOT
[facility]  [id number]  [facility] [address]  [phone]               Thank you for choosing us for your health care visit with Raquel Angela. We are glad to serve you and happy to provide you with this summary of your visit.   Magdalena Take 1 capsule by mouth daily. Commonly known as:  PROBIOTIC           DIOVAN 160 MG Tabs   Generic drug:  valsartan   Take 1 tablet (160 mg total) by mouth daily. docusate sodium 100 MG Caps   Take 100 mg by mouth daily.    Commonly known as: